# Patient Record
Sex: MALE | Race: BLACK OR AFRICAN AMERICAN | NOT HISPANIC OR LATINO | URBAN - METROPOLITAN AREA
[De-identification: names, ages, dates, MRNs, and addresses within clinical notes are randomized per-mention and may not be internally consistent; named-entity substitution may affect disease eponyms.]

---

## 2021-03-13 ENCOUNTER — INPATIENT (INPATIENT)
Facility: HOSPITAL | Age: 61
LOS: 2 days | Discharge: HOME | End: 2021-03-16
Attending: INTERNAL MEDICINE | Admitting: INTERNAL MEDICINE
Payer: COMMERCIAL

## 2021-03-13 VITALS
TEMPERATURE: 98 F | WEIGHT: 268.08 LBS | HEIGHT: 72 IN | SYSTOLIC BLOOD PRESSURE: 128 MMHG | OXYGEN SATURATION: 88 % | DIASTOLIC BLOOD PRESSURE: 80 MMHG | HEART RATE: 97 BPM | RESPIRATION RATE: 16 BRPM

## 2021-03-13 DIAGNOSIS — J96.01 ACUTE RESPIRATORY FAILURE WITH HYPOXIA: ICD-10-CM

## 2021-03-13 DIAGNOSIS — U07.1 COVID-19: ICD-10-CM

## 2021-03-13 DIAGNOSIS — E11.65 TYPE 2 DIABETES MELLITUS WITH HYPERGLYCEMIA: ICD-10-CM

## 2021-03-13 LAB
ALBUMIN SERPL ELPH-MCNC: 3.3 G/DL — SIGNIFICANT CHANGE UP (ref 3.3–5)
ALBUMIN SERPL ELPH-MCNC: 3.5 G/DL — SIGNIFICANT CHANGE UP (ref 3.3–5)
ALP SERPL-CCNC: 51 U/L — SIGNIFICANT CHANGE UP (ref 40–120)
ALP SERPL-CCNC: 53 U/L — SIGNIFICANT CHANGE UP (ref 40–120)
ALT FLD-CCNC: 42 U/L — SIGNIFICANT CHANGE UP (ref 12–78)
ALT FLD-CCNC: 43 U/L — SIGNIFICANT CHANGE UP (ref 12–78)
ANION GAP SERPL CALC-SCNC: 7 MMOL/L — SIGNIFICANT CHANGE UP (ref 5–17)
AST SERPL-CCNC: 77 U/L — HIGH (ref 15–37)
AST SERPL-CCNC: 82 U/L — HIGH (ref 15–37)
BASOPHILS # BLD AUTO: 0.01 K/UL — SIGNIFICANT CHANGE UP (ref 0–0.2)
BASOPHILS NFR BLD AUTO: 0.2 % — SIGNIFICANT CHANGE UP (ref 0–2)
BILIRUB DIRECT SERPL-MCNC: 0.15 MG/DL — SIGNIFICANT CHANGE UP (ref 0.05–0.2)
BILIRUB INDIRECT FLD-MCNC: 0.4 MG/DL — SIGNIFICANT CHANGE UP (ref 0.2–1)
BILIRUB SERPL-MCNC: 0.5 MG/DL — SIGNIFICANT CHANGE UP (ref 0.2–1.2)
BILIRUB SERPL-MCNC: 0.6 MG/DL — SIGNIFICANT CHANGE UP (ref 0.2–1.2)
BUN SERPL-MCNC: 11 MG/DL — SIGNIFICANT CHANGE UP (ref 7–23)
CALCIUM SERPL-MCNC: 8.7 MG/DL — SIGNIFICANT CHANGE UP (ref 8.5–10.1)
CHLORIDE SERPL-SCNC: 100 MMOL/L — SIGNIFICANT CHANGE UP (ref 96–108)
CO2 SERPL-SCNC: 29 MMOL/L — SIGNIFICANT CHANGE UP (ref 22–31)
CREAT SERPL-MCNC: 1.24 MG/DL — SIGNIFICANT CHANGE UP (ref 0.5–1.3)
CREAT SERPL-MCNC: 1.32 MG/DL — HIGH (ref 0.5–1.3)
CRP SERPL-MCNC: 144 MG/L — HIGH
D DIMER BLD IA.RAPID-MCNC: 186 NG/ML DDU — SIGNIFICANT CHANGE UP
D DIMER BLD IA.RAPID-MCNC: 191 NG/ML DDU — SIGNIFICANT CHANGE UP
EOSINOPHIL # BLD AUTO: 0 K/UL — SIGNIFICANT CHANGE UP (ref 0–0.5)
EOSINOPHIL NFR BLD AUTO: 0 % — SIGNIFICANT CHANGE UP (ref 0–6)
FERRITIN SERPL-MCNC: 1021 NG/ML — HIGH (ref 30–400)
GLUCOSE BLDC GLUCOMTR-MCNC: 306 MG/DL — HIGH (ref 70–99)
GLUCOSE SERPL-MCNC: 178 MG/DL — HIGH (ref 70–99)
HCT VFR BLD CALC: 33.1 % — LOW (ref 39–50)
HGB BLD-MCNC: 11.7 G/DL — LOW (ref 13–17)
IMM GRANULOCYTES NFR BLD AUTO: 0.2 % — SIGNIFICANT CHANGE UP (ref 0–1.5)
INR BLD: 1.37 RATIO — HIGH (ref 0.88–1.16)
LYMPHOCYTES # BLD AUTO: 0.98 K/UL — LOW (ref 1–3.3)
LYMPHOCYTES # BLD AUTO: 22.9 % — SIGNIFICANT CHANGE UP (ref 13–44)
MCHC RBC-ENTMCNC: 29.5 PG — SIGNIFICANT CHANGE UP (ref 27–34)
MCHC RBC-ENTMCNC: 35.3 GM/DL — SIGNIFICANT CHANGE UP (ref 32–36)
MCV RBC AUTO: 83.4 FL — SIGNIFICANT CHANGE UP (ref 80–100)
MONOCYTES # BLD AUTO: 0.39 K/UL — SIGNIFICANT CHANGE UP (ref 0–0.9)
MONOCYTES NFR BLD AUTO: 9.1 % — SIGNIFICANT CHANGE UP (ref 2–14)
NEUTROPHILS # BLD AUTO: 2.89 K/UL — SIGNIFICANT CHANGE UP (ref 1.8–7.4)
NEUTROPHILS NFR BLD AUTO: 67.6 % — SIGNIFICANT CHANGE UP (ref 43–77)
NRBC # BLD: 0 /100 WBCS — SIGNIFICANT CHANGE UP (ref 0–0)
NT-PROBNP SERPL-SCNC: 11 PG/ML — SIGNIFICANT CHANGE UP (ref 0–125)
PLATELET # BLD AUTO: 235 K/UL — SIGNIFICANT CHANGE UP (ref 150–400)
POTASSIUM SERPL-MCNC: 3.6 MMOL/L — SIGNIFICANT CHANGE UP (ref 3.5–5.3)
POTASSIUM SERPL-SCNC: 3.6 MMOL/L — SIGNIFICANT CHANGE UP (ref 3.5–5.3)
PROCALCITONIN SERPL-MCNC: 0.14 NG/ML — HIGH (ref 0.02–0.1)
PROT SERPL-MCNC: 8.2 GM/DL — SIGNIFICANT CHANGE UP (ref 6–8.3)
PROT SERPL-MCNC: 8.3 GM/DL — SIGNIFICANT CHANGE UP (ref 6–8.3)
PROTHROM AB SERPL-ACNC: 15.7 SEC — HIGH (ref 10.6–13.6)
RAPID RVP RESULT: DETECTED
RBC # BLD: 3.97 M/UL — LOW (ref 4.2–5.8)
RBC # FLD: 13.3 % — SIGNIFICANT CHANGE UP (ref 10.3–14.5)
SARS-COV-2 RNA SPEC QL NAA+PROBE: DETECTED
SODIUM SERPL-SCNC: 136 MMOL/L — SIGNIFICANT CHANGE UP (ref 135–145)
WBC # BLD: 4.28 K/UL — SIGNIFICANT CHANGE UP (ref 3.8–10.5)
WBC # FLD AUTO: 4.28 K/UL — SIGNIFICANT CHANGE UP (ref 3.8–10.5)

## 2021-03-13 PROCEDURE — 99285 EMERGENCY DEPT VISIT HI MDM: CPT

## 2021-03-13 PROCEDURE — 71045 X-RAY EXAM CHEST 1 VIEW: CPT | Mod: 26

## 2021-03-13 RX ORDER — ACETAMINOPHEN 500 MG
650 TABLET ORAL EVERY 4 HOURS
Refills: 0 | Status: DISCONTINUED | OUTPATIENT
Start: 2021-03-13 | End: 2021-03-16

## 2021-03-13 RX ORDER — SODIUM CHLORIDE 9 MG/ML
1000 INJECTION, SOLUTION INTRAVENOUS
Refills: 0 | Status: DISCONTINUED | OUTPATIENT
Start: 2021-03-13 | End: 2021-03-16

## 2021-03-13 RX ORDER — ALBUTEROL 90 UG/1
2 AEROSOL, METERED ORAL EVERY 4 HOURS
Refills: 0 | Status: DISCONTINUED | OUTPATIENT
Start: 2021-03-13 | End: 2021-03-16

## 2021-03-13 RX ORDER — DEXAMETHASONE 0.5 MG/5ML
6 ELIXIR ORAL ONCE
Refills: 0 | Status: COMPLETED | OUTPATIENT
Start: 2021-03-13 | End: 2021-03-13

## 2021-03-13 RX ORDER — DEXTROSE 50 % IN WATER 50 %
25 SYRINGE (ML) INTRAVENOUS ONCE
Refills: 0 | Status: DISCONTINUED | OUTPATIENT
Start: 2021-03-13 | End: 2021-03-16

## 2021-03-13 RX ORDER — REMDESIVIR 5 MG/ML
200 INJECTION INTRAVENOUS EVERY 24 HOURS
Refills: 0 | Status: COMPLETED | OUTPATIENT
Start: 2021-03-13 | End: 2021-03-13

## 2021-03-13 RX ORDER — GUAIFENESIN/DEXTROMETHORPHAN 600MG-30MG
10 TABLET, EXTENDED RELEASE 12 HR ORAL EVERY 4 HOURS
Refills: 0 | Status: DISCONTINUED | OUTPATIENT
Start: 2021-03-13 | End: 2021-03-16

## 2021-03-13 RX ORDER — ENOXAPARIN SODIUM 100 MG/ML
40 INJECTION SUBCUTANEOUS EVERY 12 HOURS
Refills: 0 | Status: DISCONTINUED | OUTPATIENT
Start: 2021-03-13 | End: 2021-03-16

## 2021-03-13 RX ORDER — SODIUM CHLORIDE 9 MG/ML
1000 INJECTION INTRAMUSCULAR; INTRAVENOUS; SUBCUTANEOUS
Refills: 0 | Status: DISCONTINUED | OUTPATIENT
Start: 2021-03-13 | End: 2021-03-15

## 2021-03-13 RX ORDER — DEXAMETHASONE 0.5 MG/5ML
6 ELIXIR ORAL DAILY
Refills: 0 | Status: DISCONTINUED | OUTPATIENT
Start: 2021-03-13 | End: 2021-03-15

## 2021-03-13 RX ORDER — INSULIN LISPRO 100/ML
VIAL (ML) SUBCUTANEOUS
Refills: 0 | Status: DISCONTINUED | OUTPATIENT
Start: 2021-03-13 | End: 2021-03-16

## 2021-03-13 RX ORDER — REMDESIVIR 5 MG/ML
100 INJECTION INTRAVENOUS EVERY 24 HOURS
Refills: 0 | Status: DISCONTINUED | OUTPATIENT
Start: 2021-03-14 | End: 2021-03-16

## 2021-03-13 RX ORDER — DEXTROSE 50 % IN WATER 50 %
12.5 SYRINGE (ML) INTRAVENOUS ONCE
Refills: 0 | Status: DISCONTINUED | OUTPATIENT
Start: 2021-03-13 | End: 2021-03-16

## 2021-03-13 RX ORDER — REMDESIVIR 5 MG/ML
INJECTION INTRAVENOUS
Refills: 0 | Status: DISCONTINUED | OUTPATIENT
Start: 2021-03-13 | End: 2021-03-16

## 2021-03-13 RX ORDER — DEXTROSE 50 % IN WATER 50 %
15 SYRINGE (ML) INTRAVENOUS ONCE
Refills: 0 | Status: DISCONTINUED | OUTPATIENT
Start: 2021-03-13 | End: 2021-03-16

## 2021-03-13 RX ORDER — PANTOPRAZOLE SODIUM 20 MG/1
40 TABLET, DELAYED RELEASE ORAL
Refills: 0 | Status: DISCONTINUED | OUTPATIENT
Start: 2021-03-13 | End: 2021-03-16

## 2021-03-13 RX ORDER — GLUCAGON INJECTION, SOLUTION 0.5 MG/.1ML
1 INJECTION, SOLUTION SUBCUTANEOUS ONCE
Refills: 0 | Status: DISCONTINUED | OUTPATIENT
Start: 2021-03-13 | End: 2021-03-16

## 2021-03-13 RX ADMIN — ENOXAPARIN SODIUM 40 MILLIGRAM(S): 100 INJECTION SUBCUTANEOUS at 17:46

## 2021-03-13 RX ADMIN — Medication 6 MILLIGRAM(S): at 12:07

## 2021-03-13 RX ADMIN — REMDESIVIR 500 MILLIGRAM(S): 5 INJECTION INTRAVENOUS at 23:03

## 2021-03-13 RX ADMIN — Medication 8: at 16:43

## 2021-03-13 NOTE — ED PROVIDER NOTE - CARE PLAN
Principal Discharge DX:	COVID-19  Secondary Diagnosis:	Hypoxia   Principal Discharge DX:	COVID-19  Secondary Diagnosis:	Hypoxia  Secondary Diagnosis:	Pneumonia

## 2021-03-13 NOTE — H&P ADULT - NSHPPHYSICALEXAM_GEN_ALL_CORE
PHYSICAL EXAM:  GENERAL: NAD, well-groomed, well-developed  HEAD:  Atraumatic, Normocephalic  EYES: PERRLA, conjunctiva and sclera clear  ENMT: Intact   NECK: Supple,   NERVOUS SYSTEM:  Alert & Oriented X3; Motor Strength 5/5   CHEST/LUNG: Crackles base bilaterally; No rales, rhonchi, wheezing, or rubs  HEART: Regular rate and rhythm; No murmurs, rubs, or gallops  ABDOMEN: Soft, Nontender, Nondistended; Bowel sounds present  EXTREMITIES:   No clubbing, cyanosis, or edema  SKIN: No rashes or lesions

## 2021-03-13 NOTE — H&P ADULT - HISTORY OF PRESENT ILLNESS
59yo, BM with h/o DM2 presents today c/o one week h/o flu like symptoms, associated with  dry cough, severe bodyaches, fevers and poor appetite, today pt reports having chest tightness and persistent cough.  Also noted   increasing SOB.  Had + COVID test on Thursday.   In ER, O2 sat 88%, placed on NRB with improvement.

## 2021-03-13 NOTE — CONSULT NOTE ADULT - SUBJECTIVE AND OBJECTIVE BOX
Patient is a 60y old  Male who presents with a chief complaint of Shortness of breath (13 Mar 2021 16:03)    HPI:  59yo, BM with Obesity, DM2 presents  c/o one week h/o flu like symptoms, associated with  dry cough, severe body aches,  fevers and poor appetite. Today  pt reports having chest tightness and persistent cough.  Also noted increasing SOB.  Had + COVID test on Thursday.   In ER, O2 sat 88%, placed on NRB with improvement.  (13 Mar 2021 16:03)    PAST MEDICAL & SURGICAL HISTORY:  Diabetes  No significant past surgical history    SOCIAL HISTORY: BMI (kg/m2): 36.4 (03-13 @ 09:50). Non rebreather    Allergies  No Known Allergies    MEDICATIONS  (STANDING):  dexAMETHasone  Injectable 6 milliGRAM(s) IV Push daily  dextrose 40% Gel 15 Gram(s) Oral once  dextrose 5%. 1000 milliLiter(s) (50 mL/Hr) IV Continuous <Continuous>  dextrose 5%. 1000 milliLiter(s) (100 mL/Hr) IV Continuous <Continuous>  dextrose 50% Injectable 25 Gram(s) IV Push once  dextrose 50% Injectable 12.5 Gram(s) IV Push once  dextrose 50% Injectable 25 Gram(s) IV Push once  enoxaparin Injectable 40 milliGRAM(s) SubCutaneous every 12 hours  glucagon  Injectable 1 milliGRAM(s) IntraMuscular once  insulin lispro (ADMELOG) corrective regimen sliding scale   SubCutaneous three times a day before meals  pantoprazole    Tablet 40 milliGRAM(s) Oral before breakfast  remdesivir  IVPB   IV Intermittent   remdesivir  IVPB 200 milliGRAM(s) IV Intermittent every 24 hours    MEDICATIONS  (PRN):  acetaminophen   Tablet .. 650 milliGRAM(s) Oral every 4 hours PRN Temp greater or equal to 38.5C (101.3F)  ALBUTerol    90 MICROgram(s) HFA Inhaler 2 Puff(s) Inhalation every 4 hours PRN Shortness of Breath and/or Wheezing  guaifenesin/dextromethorphan  Syrup 10 milliLiter(s) Oral every 4 hours PRN Cough    Vital Signs Last 24 Hrs  T(C): 36.7 (13 Mar 2021 18:35), Max: 37.7 (13 Mar 2021 12:41)  T(F): 98.1 (13 Mar 2021 18:35), Max: 99.8 (13 Mar 2021 12:41)  HR: 98 (13 Mar 2021 18:35) (83 - 99)  BP: 109/74 (13 Mar 2021 18:35) (99/67 - 128/80)  BP(mean): --  RR: 18 (13 Mar 2021 18:35) (16 - 25)  SpO2: 100% (13 Mar 2021 18:35) (88% - 100%)    PHYSICAL EXAM:  GEN:         Awake, responsive.  HEENT:    Normal.    RESP:      mild Distress  CVS:        Regular rate and rhythm.   ABD:         Soft, non-tender, non-distended;   SKIN:           Warm and dry.  EXTR:            No clubbing, cyanosis or edema  CNS:              Intact sensory and motor function.  PSYCH:        cooperative, no anxiety or depression    LABS:                        11.7   4.28  )-----------( 235      ( 13 Mar 2021 10:27 )             33.1     03-13    x   |  x   |  x   ----------------------------<  x   x    |  x   |  1.32<H>    Ca    8.7      13 Mar 2021 10:27    TPro  8.2  /  Alb  3.3  /  TBili  0.5  /  DBili  .15  /  AST  82<H>  /  ALT  42  /  AlkPhos  53  03-13    PT/INR - ( 13 Mar 2021 16:13 )   PT: 15.7 sec;   INR: 1.37 ratio      EKG: sinus    RADIOLOGY & ADDITIONAL STUDIES:  < from: Xray Chest 1 View- PORTABLE-Urgent (Xray Chest 1 View- PORTABLE-Urgent .) (03.13.21 @ 11:35) >  EXAM:  XR CHEST PORTABLE URGENT 1V                          PROCEDURE DATE:  03/13/2021      INTERPRETATION:  Chest radiograph (one view)     CPT 37090    CLINICAL INFORMATION:  Patient is unable to communicate. Covid positive.  Short of breath.    TECHNIQUE:  Single frontal view of the chest was obtained.    FINDINGS:  No previous examinations are available for review.    The lungs demonstrate patchy airspace opacity in the right lower lobe suspicious for pneumonia. The right costophrenic angle is not imaged on the film. The left costophrenic angle appears sharp. The heart and mediastinum appear intact.      IMPRESSION: Patchy airspace opacity noted the right lower lobe suspicious for pneumonia.    NARCISA RICH MD; Attending Radiologist  This document has been electronically signed. Mar 13 2021 11:55AM    ASSESSMENT AND PLAN:  ·	Acute hypoxic Respiratory failure.  ·	COVID Pneumonia.  ·	DM.  ·	Obesity    SPO2 88% on room air in ED, now requiring NRB mask.  Continue Remdesivir and Dexamethasone.  ON BID Lovenox.  Gentle hydration.  Spoke with medicine PA, to get in touch with ID for Tocilizumab.

## 2021-03-13 NOTE — ED PROVIDER NOTE - OBJECTIVE STATEMENT
60 year old male with h/o diabetes presents today c/o one week h/o flu like symptoms, pt reports having a dry cough, severe bodyaches, fevers and poor appetite, today pt reports having chest tightness and persistent cough (-) nausea or vomiting (-) diarrhea (-)

## 2021-03-13 NOTE — ED ADULT NURSE NOTE - OBJECTIVE STATEMENT
Received patient in bed 13. AOX4. BIBA c/o weakness for three days and patient tested positive for COVID yesterday. POX low 88%RA. witnessed ambulating with steady gait. Monitor/EKG completed. Patient speaking clearly but needing to catch breath between words. Awaiting to be seen

## 2021-03-13 NOTE — H&P ADULT - NSHPLABSRESULTS_GEN_ALL_CORE
LABS:                        11.7   4.28  )-----------( 235      ( 13 Mar 2021 10:27 )             33.1     03-13    136  |  100  |  11  ----------------------------<  178<H>  3.6   |  29  |  1.24    Ca    8.7      13 Mar 2021 10:27    TPro  8.3  /  Alb  3.5  /  TBili  0.6  /  DBili  x   /  AST  77<H>  /  ALT  43  /  AlkPhos  51  03-13    Ferritin, Serum: 1021 ng/mL (03.13.21 @ 14:08)   Procalcitonin, Serum: 0.14: Procalcitonin (PCT) Interpretation (ng/mL) - Diagnosis of systemic   C-Reactive Protein, Serum: 144: Please note: Reference range has changed due to units of measure change.   D-Dimer Assay, Quantitative: 191 ng/mL DDU (03.13.21 @ 10:27)       SARS-CoV-2: Detected: This Respiratory Panel uses polymerase chain reaction (PCR) to detect for         < from: Xray Chest 1 View- PORTABLE-Urgent (Xray Chest 1 View- PORTABLE-Urgent .) (03.13.21 @ 11:35) >      CLINICAL INFORMATION:  Patient is unable to communicate. Covid positive.  Short of breath.    TECHNIQUE:  Single frontal view of the chest was obtained.    FINDINGS:  No previous examinations are available for review.    The lungs demonstrate patchy airspace opacity in the right lower lobe suspicious for pneumonia. The right costophrenic angle is not imaged on the film. The left costophrenic angle appears sharp. The heart and mediastinum appear intact.      IMPRESSION: Patchy airspace opacity noted the right lower lobe suspicious for pneumonia.        < end of copied text >

## 2021-03-14 LAB
A1C WITH ESTIMATED AVERAGE GLUCOSE RESULT: 8.3 % — HIGH (ref 4–5.6)
ALBUMIN SERPL ELPH-MCNC: 3 G/DL — LOW (ref 3.3–5)
ALBUMIN SERPL ELPH-MCNC: 3.1 G/DL — LOW (ref 3.3–5)
ALP SERPL-CCNC: 47 U/L — SIGNIFICANT CHANGE UP (ref 40–120)
ALP SERPL-CCNC: 52 U/L — SIGNIFICANT CHANGE UP (ref 40–120)
ALT FLD-CCNC: 42 U/L — SIGNIFICANT CHANGE UP (ref 12–78)
ALT FLD-CCNC: 45 U/L — SIGNIFICANT CHANGE UP (ref 12–78)
ANION GAP SERPL CALC-SCNC: 7 MMOL/L — SIGNIFICANT CHANGE UP (ref 5–17)
AST SERPL-CCNC: 67 U/L — HIGH (ref 15–37)
AST SERPL-CCNC: 68 U/L — HIGH (ref 15–37)
BILIRUB DIRECT SERPL-MCNC: 0.11 MG/DL — SIGNIFICANT CHANGE UP (ref 0.05–0.2)
BILIRUB INDIRECT FLD-MCNC: 0.2 MG/DL — SIGNIFICANT CHANGE UP (ref 0.2–1)
BILIRUB SERPL-MCNC: 0.3 MG/DL — SIGNIFICANT CHANGE UP (ref 0.2–1.2)
BILIRUB SERPL-MCNC: 0.3 MG/DL — SIGNIFICANT CHANGE UP (ref 0.2–1.2)
BUN SERPL-MCNC: 15 MG/DL — SIGNIFICANT CHANGE UP (ref 7–23)
CALCIUM SERPL-MCNC: 8.6 MG/DL — SIGNIFICANT CHANGE UP (ref 8.5–10.1)
CHLORIDE SERPL-SCNC: 103 MMOL/L — SIGNIFICANT CHANGE UP (ref 96–108)
CO2 SERPL-SCNC: 28 MMOL/L — SIGNIFICANT CHANGE UP (ref 22–31)
CREAT SERPL-MCNC: 1.12 MG/DL — SIGNIFICANT CHANGE UP (ref 0.5–1.3)
CREAT SERPL-MCNC: 1.15 MG/DL — SIGNIFICANT CHANGE UP (ref 0.5–1.3)
CRP SERPL-MCNC: 138 MG/L — HIGH
CRP SERPL-MCNC: 150 MG/L — HIGH
D DIMER BLD IA.RAPID-MCNC: 257 NG/ML DDU — HIGH
ESTIMATED AVERAGE GLUCOSE: 192 MG/DL — HIGH (ref 68–114)
FERRITIN SERPL-MCNC: 1047 NG/ML — HIGH (ref 30–400)
FERRITIN SERPL-MCNC: 1112 NG/ML — HIGH (ref 30–400)
GLUCOSE BLDC GLUCOMTR-MCNC: 271 MG/DL — HIGH (ref 70–99)
GLUCOSE BLDC GLUCOMTR-MCNC: 275 MG/DL — HIGH (ref 70–99)
GLUCOSE BLDC GLUCOMTR-MCNC: 277 MG/DL — HIGH (ref 70–99)
GLUCOSE BLDC GLUCOMTR-MCNC: 330 MG/DL — HIGH (ref 70–99)
GLUCOSE SERPL-MCNC: 249 MG/DL — HIGH (ref 70–99)
HCT VFR BLD CALC: 32 % — LOW (ref 39–50)
HCV AB S/CO SERPL IA: 0.11 S/CO — SIGNIFICANT CHANGE UP (ref 0–0.99)
HCV AB SERPL-IMP: SIGNIFICANT CHANGE UP
HGB BLD-MCNC: 11 G/DL — LOW (ref 13–17)
INR BLD: 1.32 RATIO — HIGH (ref 0.88–1.16)
LDH SERPL L TO P-CCNC: 411 U/L — HIGH (ref 50–242)
LDH SERPL L TO P-CCNC: 439 U/L — HIGH (ref 50–242)
MCHC RBC-ENTMCNC: 28.8 PG — SIGNIFICANT CHANGE UP (ref 27–34)
MCHC RBC-ENTMCNC: 34.4 GM/DL — SIGNIFICANT CHANGE UP (ref 32–36)
MCV RBC AUTO: 83.8 FL — SIGNIFICANT CHANGE UP (ref 80–100)
NRBC # BLD: 0 /100 WBCS — SIGNIFICANT CHANGE UP (ref 0–0)
PLATELET # BLD AUTO: 264 K/UL — SIGNIFICANT CHANGE UP (ref 150–400)
POTASSIUM SERPL-MCNC: 4.1 MMOL/L — SIGNIFICANT CHANGE UP (ref 3.5–5.3)
POTASSIUM SERPL-SCNC: 4.1 MMOL/L — SIGNIFICANT CHANGE UP (ref 3.5–5.3)
PROT SERPL-MCNC: 7.6 GM/DL — SIGNIFICANT CHANGE UP (ref 6–8.3)
PROT SERPL-MCNC: 7.8 GM/DL — SIGNIFICANT CHANGE UP (ref 6–8.3)
PROTHROM AB SERPL-ACNC: 15.1 SEC — HIGH (ref 10.6–13.6)
RBC # BLD: 3.82 M/UL — LOW (ref 4.2–5.8)
RBC # FLD: 13.4 % — SIGNIFICANT CHANGE UP (ref 10.3–14.5)
SODIUM SERPL-SCNC: 138 MMOL/L — SIGNIFICANT CHANGE UP (ref 135–145)
WBC # BLD: 4.73 K/UL — SIGNIFICANT CHANGE UP (ref 3.8–10.5)
WBC # FLD AUTO: 4.73 K/UL — SIGNIFICANT CHANGE UP (ref 3.8–10.5)

## 2021-03-14 RX ORDER — LEVOTHYROXINE SODIUM 125 MCG
137 TABLET ORAL DAILY
Refills: 0 | Status: DISCONTINUED | OUTPATIENT
Start: 2021-03-14 | End: 2021-03-16

## 2021-03-14 RX ORDER — LEVOTHYROXINE SODIUM 125 MCG
1 TABLET ORAL
Qty: 0 | Refills: 0 | DISCHARGE

## 2021-03-14 RX ADMIN — REMDESIVIR 500 MILLIGRAM(S): 5 INJECTION INTRAVENOUS at 22:10

## 2021-03-14 RX ADMIN — Medication 6: at 08:11

## 2021-03-14 RX ADMIN — ALBUTEROL 2 PUFF(S): 90 AEROSOL, METERED ORAL at 11:33

## 2021-03-14 RX ADMIN — Medication 10 MILLILITER(S): at 11:33

## 2021-03-14 RX ADMIN — Medication 6 MILLIGRAM(S): at 06:16

## 2021-03-14 RX ADMIN — SODIUM CHLORIDE 75 MILLILITER(S): 9 INJECTION INTRAMUSCULAR; INTRAVENOUS; SUBCUTANEOUS at 13:24

## 2021-03-14 RX ADMIN — SODIUM CHLORIDE 75 MILLILITER(S): 9 INJECTION INTRAMUSCULAR; INTRAVENOUS; SUBCUTANEOUS at 00:17

## 2021-03-14 RX ADMIN — SODIUM CHLORIDE 75 MILLILITER(S): 9 INJECTION INTRAMUSCULAR; INTRAVENOUS; SUBCUTANEOUS at 22:19

## 2021-03-14 RX ADMIN — Medication 6: at 16:32

## 2021-03-14 RX ADMIN — Medication 8: at 11:32

## 2021-03-14 RX ADMIN — ENOXAPARIN SODIUM 40 MILLIGRAM(S): 100 INJECTION SUBCUTANEOUS at 17:42

## 2021-03-14 RX ADMIN — PANTOPRAZOLE SODIUM 40 MILLIGRAM(S): 20 TABLET, DELAYED RELEASE ORAL at 06:17

## 2021-03-14 RX ADMIN — ENOXAPARIN SODIUM 40 MILLIGRAM(S): 100 INJECTION SUBCUTANEOUS at 06:16

## 2021-03-14 NOTE — CHART NOTE - NSCHARTNOTEFT_GEN_A_CORE
House Medicine PA    Called by Dr. Mcfarlane, pulmonology to discuss with infectious disease specialist if patient is a candidate for Tocilizumab. Per Dr. Persaud, patient is not a candidate, based on specific criteria. Will continue to monitor patient, and continue dexamethasone, and non-rebreather oxygen.

## 2021-03-15 LAB
ALBUMIN SERPL ELPH-MCNC: 2.9 G/DL — LOW (ref 3.3–5)
ALP SERPL-CCNC: 47 U/L — SIGNIFICANT CHANGE UP (ref 40–120)
ALT FLD-CCNC: 40 U/L — SIGNIFICANT CHANGE UP (ref 12–78)
ANION GAP SERPL CALC-SCNC: 8 MMOL/L — SIGNIFICANT CHANGE UP (ref 5–17)
AST SERPL-CCNC: 51 U/L — HIGH (ref 15–37)
BILIRUB DIRECT SERPL-MCNC: 0.14 MG/DL — SIGNIFICANT CHANGE UP (ref 0.05–0.2)
BILIRUB INDIRECT FLD-MCNC: 0.3 MG/DL — SIGNIFICANT CHANGE UP (ref 0.2–1)
BILIRUB SERPL-MCNC: 0.4 MG/DL — SIGNIFICANT CHANGE UP (ref 0.2–1.2)
BUN SERPL-MCNC: 17 MG/DL — SIGNIFICANT CHANGE UP (ref 7–23)
CALCIUM SERPL-MCNC: 8.7 MG/DL — SIGNIFICANT CHANGE UP (ref 8.5–10.1)
CHLORIDE SERPL-SCNC: 102 MMOL/L — SIGNIFICANT CHANGE UP (ref 96–108)
CO2 SERPL-SCNC: 26 MMOL/L — SIGNIFICANT CHANGE UP (ref 22–31)
CREAT SERPL-MCNC: 1.24 MG/DL — SIGNIFICANT CHANGE UP (ref 0.5–1.3)
CRP SERPL-MCNC: 73 MG/L — HIGH
FERRITIN SERPL-MCNC: 1202 NG/ML — HIGH (ref 30–400)
GLUCOSE BLDC GLUCOMTR-MCNC: 267 MG/DL — HIGH (ref 70–99)
GLUCOSE BLDC GLUCOMTR-MCNC: 277 MG/DL — HIGH (ref 70–99)
GLUCOSE BLDC GLUCOMTR-MCNC: 280 MG/DL — HIGH (ref 70–99)
GLUCOSE BLDC GLUCOMTR-MCNC: 307 MG/DL — HIGH (ref 70–99)
GLUCOSE BLDC GLUCOMTR-MCNC: 347 MG/DL — HIGH (ref 70–99)
GLUCOSE SERPL-MCNC: 265 MG/DL — HIGH (ref 70–99)
HCT VFR BLD CALC: 31.5 % — LOW (ref 39–50)
HGB BLD-MCNC: 10.9 G/DL — LOW (ref 13–17)
INR BLD: 1.4 RATIO — HIGH (ref 0.88–1.16)
LDH SERPL L TO P-CCNC: 457 U/L — HIGH (ref 50–242)
MCHC RBC-ENTMCNC: 29.1 PG — SIGNIFICANT CHANGE UP (ref 27–34)
MCHC RBC-ENTMCNC: 34.6 GM/DL — SIGNIFICANT CHANGE UP (ref 32–36)
MCV RBC AUTO: 84 FL — SIGNIFICANT CHANGE UP (ref 80–100)
NRBC # BLD: 0 /100 WBCS — SIGNIFICANT CHANGE UP (ref 0–0)
PLATELET # BLD AUTO: 334 K/UL — SIGNIFICANT CHANGE UP (ref 150–400)
POTASSIUM SERPL-MCNC: 3.7 MMOL/L — SIGNIFICANT CHANGE UP (ref 3.5–5.3)
POTASSIUM SERPL-SCNC: 3.7 MMOL/L — SIGNIFICANT CHANGE UP (ref 3.5–5.3)
PROT SERPL-MCNC: 7.2 GM/DL — SIGNIFICANT CHANGE UP (ref 6–8.3)
PROTHROM AB SERPL-ACNC: 16 SEC — HIGH (ref 10.6–13.6)
RBC # BLD: 3.75 M/UL — LOW (ref 4.2–5.8)
RBC # FLD: 13.4 % — SIGNIFICANT CHANGE UP (ref 10.3–14.5)
SODIUM SERPL-SCNC: 136 MMOL/L — SIGNIFICANT CHANGE UP (ref 135–145)
WBC # BLD: 9.81 K/UL — SIGNIFICANT CHANGE UP (ref 3.8–10.5)
WBC # FLD AUTO: 9.81 K/UL — SIGNIFICANT CHANGE UP (ref 3.8–10.5)

## 2021-03-15 PROCEDURE — 99254 IP/OBS CNSLTJ NEW/EST MOD 60: CPT

## 2021-03-15 RX ORDER — DEXAMETHASONE 0.5 MG/5ML
6 ELIXIR ORAL DAILY
Refills: 0 | Status: DISCONTINUED | OUTPATIENT
Start: 2021-03-16 | End: 2021-03-16

## 2021-03-15 RX ORDER — INSULIN GLARGINE 100 [IU]/ML
10 INJECTION, SOLUTION SUBCUTANEOUS AT BEDTIME
Refills: 0 | Status: DISCONTINUED | OUTPATIENT
Start: 2021-03-15 | End: 2021-03-16

## 2021-03-15 RX ADMIN — Medication 6: at 08:20

## 2021-03-15 RX ADMIN — ENOXAPARIN SODIUM 40 MILLIGRAM(S): 100 INJECTION SUBCUTANEOUS at 17:35

## 2021-03-15 RX ADMIN — Medication 8: at 11:33

## 2021-03-15 RX ADMIN — PANTOPRAZOLE SODIUM 40 MILLIGRAM(S): 20 TABLET, DELAYED RELEASE ORAL at 05:58

## 2021-03-15 RX ADMIN — REMDESIVIR 500 MILLIGRAM(S): 5 INJECTION INTRAVENOUS at 22:27

## 2021-03-15 RX ADMIN — Medication 6 MILLIGRAM(S): at 05:57

## 2021-03-15 RX ADMIN — Medication 10 MILLILITER(S): at 05:58

## 2021-03-15 RX ADMIN — ENOXAPARIN SODIUM 40 MILLIGRAM(S): 100 INJECTION SUBCUTANEOUS at 05:57

## 2021-03-15 RX ADMIN — Medication 6: at 16:34

## 2021-03-15 RX ADMIN — INSULIN GLARGINE 10 UNIT(S): 100 INJECTION, SOLUTION SUBCUTANEOUS at 21:39

## 2021-03-15 RX ADMIN — Medication 137 MICROGRAM(S): at 05:58

## 2021-03-15 NOTE — CONSULT NOTE ADULT - SUBJECTIVE AND OBJECTIVE BOX
Full note to follow.  COVID-19  Rapidly improved down to NC  Not a candidate for Tocilizumab at this time.  Contine RDV/Dex  Precautions per protocol, ideally airborne and contact in negative pressure room  Supplemental oxygen as required to maintain adequate saturation.  Avoid NIPPV, high flow O2, nebulilzers, or other interventions which may increase the likelihood of aerosolization as much as feasible  High threshold for antibiotic use  Vigilance for secondary infection and other superimposed events  Monitor inflammatory markers and lab data  DVT prophylaxis per protocol.  RDV x5 days  Trend creatinine and ALT on RDV  Dexamethasone [id number]  Good but not overly tight diabetic control to avoid iatrogenic hypoglycemia.   Thank you for the courtesy of this referral.   Papa Frye MD  Attending Physician  [facility]  Division of Infectious Diseases  [phone]  -------------------  [facility]  [phone]    KERVIN MARIE  60y, Male  [id number]    HPI--      PMH/PSH--  Diabetes    No significant past surgical history        Allergies--  No Known Allergies      Medications--  Antibiotics: remdesivir  IVPB   IV Intermittent   remdesivir  IVPB 100 milliGRAM(s) IV Intermittent every 24 hours    Immunologic:   Other: acetaminophen   Tablet .. PRN  ALBUTerol    90 MICROgram(s) HFA Inhaler PRN  dexAMETHasone     Tablet  dextrose 40% Gel  dextrose 5%.  dextrose 5%.  dextrose 50% Injectable  dextrose 50% Injectable  dextrose 50% Injectable  enoxaparin Injectable  glucagon  Injectable  guaifenesin/dextromethorphan  Syrup PRN  insulin lispro (ADMELOG) corrective regimen sliding scale  levothyroxine  pantoprazole    Tablet  sodium chloride 0.9%.    Antimicrobials last 90 days per EMR: MEDICATIONS  (STANDING):    remdesivir  IVPB   500 mL/Hr IV Intermittent (03-13-21 @ 23:03)    remdesivir  IVPB   500 mL/Hr IV Intermittent (03-14-21 @ 22:10)        Social History--  EtOH: denies   Tobacco: denies   Drug Use: denies     Family/Marital History--        Travel/Environmental/Occupational History:      Review of Systems:  A >=10-point review of systems was obtained.     Pertinent positives and negatives--  Constitutional: No fevers. No Chills. No Rigors.   Eyes:  ENMT:  Cardiovascular: No chest pain. No palpitations.  Respiratory: No shortness of breath. No cough.  Gastrointestinal: No nausea or vomiting. No diarrhea or constipation.   Genitourinary:  Musculoskeletal:  Skin:  Neurologic:  Psychiatric: Pleasant. Appropriate affect.  Endocrine:  Heme/Lymphatic:  Allergy/Immunologic:    Review of systems otherwise negative except as previously noted.    Physical Exam--  Vital Signs: T(F): 97.7 (03-15-21 @ 11:12), Max: 98.7 (03-15-21 @ 05:03)  HR: 85 (03-15-21 @ 11:12)  BP: 127/81 (03-15-21 @ 11:12)  RR: 18 (03-15-21 @ 11:12)  SpO2: 96% (03-15-21 @ 11:12)  Wt(kg): --  General: Nontoxic-appearing Male in no acute distress.  HEENT: AT/NC. PERRL. EOMI. Anicteric. Conjunctiva pink and moist. Oropharynx clear. Dentition fair.  Neck: Not rigid. No sense of mass.  Nodes: None palpable.  Lungs: Clear bilaterally without rales, wheezing or rhonchi  Heart: Regular rate and rhythm. No Murmur. No rub. No gallop. No palpable thrill.  Abdomen: Bowel sounds present and normoactive. Soft. Nondistended. Nontender. No sense of mass. No organomegaly.  Back: No spinal tenderness. No costovertebral angle tenderness.   Extremities: No cyanosis or clubbing. No edema.   Skin: Warm. Dry. Good turgor. No rash. No vasculitic stigmata.  Psychiatric: Appropriate affect and mood for situation.         Laboratory & Imaging Data--  CBC                        10.9   9.81  )-----------( 334      ( 15 Mar 2021 08:09 )             31.5       Chemistries  03-15    136  |  102  |  17  ----------------------------<  265<H>  3.7   |  26  |  1.24    Ca    8.7      15 Mar 2021 08:09    TPro  7.2  /  Alb  2.9<L>  /  TBili  0.4  /  DBili  .14  /  AST  51<H>  /  ALT  40  /  AlkPhos  47  03-15      Culture Data       Full note to follow.  COVID-19  Rapidly improved down to NC  Not a candidate for Tocilizumab at this time.  Contine RDV/Dex  Precautions per protocol, ideally airborne and contact in negative pressure room  Supplemental oxygen as required to maintain adequate saturation.  Avoid NIPPV, high flow O2, nebulilzers, or other interventions which may increase the likelihood of aerosolization as much as feasible  High threshold for antibiotic use  Vigilance for secondary infection and other superimposed events  Monitor inflammatory markers and lab data  DVT prophylaxis per protocol.  RDV x5 days  Trend creatinine and ALT on RDV  Dexamethasone [id number]  Good but not overly tight diabetic control to avoid iatrogenic hypoglycemia.   Thank you for the courtesy of this referral.   Papa Frye MD  Attending Physician  [facility]  Division of Infectious Diseases  [phone]  -------------------  [facility]  Division of Infectious Diseases  [phone]    MARIE GALEANA  60y, Male  [id number]    HPI--  60M with DM,2 on metformin, hypothyroid on synthroid, denies other significant PMH presented with frequent cough and shortness of breath accompanied by fevers, chills, weakness, myalgias and anorexia for 1 week prior to admission. Patient tested for COVID 3/11, felt very depressed about it afterward. thinks he was likely exposed at work. Patient did not improve and presented to ED. Patient hypoxic on RA an immediately placed on NRB. Dr. Javier called by PA (though was not on call):  House Medicine PA    Called by Dr. Mcfarlane, pulmonology to discuss with infectious disease specialist if patient is a candidate for Tocilizumab. Per Dr. Persaud, patient is not a candidate, based on specific criteria. Will continue to monitor patient, and continue dexamethasone, and non-rebreather oxygen.House Medicine PA    He was given steroids and RDV. Patient today on NC and feeling "much better."    PMH/PSH--  Diabetes    No significant past surgical history      Allergies--  No Known Allergies      Medications--  Antibiotics: remdesivir  IVPB   IV Intermittent   remdesivir  IVPB 100 milliGRAM(s) IV Intermittent every 24 hours    Immunologic:   Other: acetaminophen   Tablet .. PRN  ALBUTerol    90 MICROgram(s) HFA Inhaler PRN  dexAMETHasone     Tablet  dextrose 40% Gel  dextrose 5%.  dextrose 5%.  dextrose 50% Injectable  dextrose 50% Injectable  dextrose 50% Injectable  enoxaparin Injectable  glucagon  Injectable  guaifenesin/dextromethorphan  Syrup PRN  insulin lispro (ADMELOG) corrective regimen sliding scale  levothyroxine  pantoprazole    Tablet  sodium chloride 0.9%.    Antimicrobials last 90 days per EMR: MEDICATIONS  (STANDING):    remdesivir  IVPB   500 mL/Hr IV Intermittent (03-13-21 @ 23:03)    remdesivir  IVPB   500 mL/Hr IV Intermittent (03-14-21 @ 22:10)        Social History--  EtOH: denies   Tobacco: denies   Drug Use: denies     Family/Marital History--        Travel/Environmental/Occupational History:  Works in construction/management    Review of Systems:  A >=10-point review of systems was obtained.     Pertinent positives and negatives--  Constitutional: +fevers. + Chills. No Rigors.   Eyes: denies.   ENMT: no loss os smell or taste  Cardiovascular: No chest pain. No palpitations.  Respiratory: + shortness of breath. + cough.  Gastrointestinal: No nausea or vomiting. No diarrhea or constipation.   Genitourinary: denies.   Musculoskeletal: as above  Skin: denies.   Neurologic: denies.   Psychiatric: Pleasant. Appropriate affect.  Endocrine: as above  Heme/Lymphatic: denies.   Allergy/Immunologic: denies.     Review of systems otherwise negative except as previously noted.    Physical Exam--  Vital Signs: T(F): 97.7 (03-15-21 @ 11:12), Max: 98.7 (03-15-21 @ 05:03)  HR: 85 (03-15-21 @ 11:12)  BP: 127/81 (03-15-21 @ 11:12)  RR: 18 (03-15-21 @ 11:12)  SpO2: 96% (03-15-21 @ 11:12)  Wt(kg): --  General: Nontoxic-appearing Male in no acute distress.  HEENT: AT/NC. PERRL. EOMI. Anicteric. Conjunctiva pink and moist. Oropharynx clear.  Neck: Not rigid. No sense of mass.  Nodes: None palpable.  Lungs: Diminsihed BS bilaterally without rales, wheezing or rhonchi  Heart: Regular rate and rhythm. No Murmur. No rub. No gallop. No palpable thrill.  Abdomen: Bowel sounds present and normoactive. Soft. Nondistended. Nontender. No sense of mass. No organomegaly.  Back: No spinal tenderness. No costovertebral angle tenderness.   Extremities: No cyanosis or clubbing. No edema.   Skin: Warm. Dry. Good turgor. No rash. No vasculitic stigmata.  Psychiatric: Appropriate affect and mood for situation.         Laboratory & Imaging Data--  CBC                        10.9   9.81  )-----------( 334      ( 15 Mar 2021 08:09 )             31.5       Chemistries  03-15    136  |  102  |  17  ----------------------------<  265<H>  3.7   |  26  |  1.24    Ca    8.7      15 Mar 2021 08:09    TPro  7.2  /  Alb  2.9<L>  /  TBili  0.4  /  DBili  .14  /  AST  51<H>  /  ALT  40  /  AlkPhos  47  03-15    COVID-related labs  Neutrophil and Lymphocyte count (NLR <3 vs. >5, better vs. worse prognosis)  Auto Neutrophil #: 2.89 K/uL (03-13-21 @ 10:27)  Auto Lymphocyte #: 0.98 K/uL (03-13-21 @ 10:27)      Procalcitonin (<0.2, worse prognosis)  0.14 ng/mL (03-13-21 @ 14:06)      Ferritin (<450 vs. >850, better vs. worse prognosis)  1202 ng/mL (03-15-21 @ 11:49)  1112 ng/mL (03-14-21 @ 11:22)  1047 ng/mL (03-14-21 @ 00:28)  1021 ng/mL (03-13-21 @ 14:08)      CRP (<20 mg/dL vs. >60 mg/dL , better vs. worse prognosis)  73 mg/L (03-15-21 @ 11:50)  138 mg/L (03-14-21 @ 11:17)  150 mg/L (03-14-21 @ 00:20)  144 mg/L (03-13-21 @ 14:06)      D-dimer (<1000 vs. > 1000, better vs. worse prognosis)  257 ng/mL DDU (03-14-21 @ 07:15)  186 ng/mL DDU (03-13-21 @ 16:13)  191 ng/mL DDU (03-13-21 @ 10:27)      Creatinine/estGFR  Creatinine, Serum: 1.24 mg/dL (03-15-21 @ 08:09)  eGFR if Non African American: 63 mL/min/1.73M2 (03-15-21 @ 08:09)  eGFR if : 73 mL/min/1.73M2 (03-15-21 @ 08:09)  Creatinine, Serum: 1.12 mg/dL (03-14-21 @ 07:15)  eGFR if Non African American: 72 mL/min/1.73M2 (03-14-21 @ 07:15)  eGFR if African American: 83 mL/min/1.73M2 (03-14-21 @ 07:15)  Creatinine, Serum: 1.15 mg/dL (03-14-21 @ 07:15)  eGFR if Non : 69 mL/min/1.73M2 (03-14-21 @ 07:15)  eGFR if African American: 80 mL/min/1.73M2 (03-14-21 @ 07:15)  Creatinine, Serum: 1.32 mg/dL (03-13-21 @ 16:12)  eGFR if Non African American: 59 mL/min/1.73M2 (03-13-21 @ 16:12)  eGFR if : 68 mL/min/1.73M2 (03-13-21 @ 16:12)  Creatinine, Serum: 1.24 mg/dL (03-13-21 @ 10:27)  eGFR if Non African American: 63 mL/min/1.73M2 (03-13-21 @ 10:27)  eGFR if : 73 mL/min/1.73M2 (03-13-21 @ 10:27)      ALT  40 U/L (03-15-21 @ 08:09)  45 U/L (03-14-21 @ 07:15)  42 U/L (03-14-21 @ 07:15)  42 U/L (03-13-21 @ 16:12)  43 U/L (03-13-21 @ 10:27)      Culture Data  None    < from: Xray Chest 1 View- PORTABLE-Urgent (Xray Chest 1 View- PORTABLE-Urgent .) (03.13.21 @ 11:35) >    EXAM:  XR CHEST PORTABLE URGENT 1V                            PROCEDURE DATE:  03/13/2021          INTERPRETATION:  Chest radiograph (one view)     CPT 59259    CLINICAL INFORMATION:  Patient is unable to communicate. Covid positive.  Short of breath.    TECHNIQUE:  Single frontal view of the chest was obtained.    FINDINGS:  No previous examinations are available for review.    The lungs demonstrate patchy airspace opacity in the right lower lobe suspicious for pneumonia. The right costophrenic angle is not imaged on the film. The left costophrenic angle appears sharp. The heart and mediastinum appear intact.    IMPRESSION: Patchy airspace opacity noted the right lower lobe suspicious for pneumonia.    NARCISA RICH MD; Attending Radiologist  This document has been electronically signed. Mar 13 2021 11:55AM    < end of copied text >

## 2021-03-15 NOTE — CONSULT NOTE ADULT - ASSESSMENT
COVID-19 with pneumonia, hypoxemia, Rapidly improved down to NC. He is not a candidate for Tocilizumab at this time.  The clinical and experimental literature involving medications in SARS-CoV-2/COVID-19 evolves rapidly as we learn more about the virus.     A general COVID-19 severity of illness categorization (NIH.gov):  •Asymptomatic or Presymptomatic Infection: Individuals who test positive for SARS-CoV-2 by virologic testing using a molecular diagnostic (e.g., polymerase chain reaction) or antigen test, but have no symptoms.  •Mild Illness: Individuals who have any of the various signs and symptoms of COVID 19 (e.g., fever, cough, sore throat, malaise, headache, muscle pain) without shortness of breath, dyspnea, or abnormal chest imaging.  •Moderate Illness: Individuals who have evidence of lower respiratory disease by clinical assessment or imaging and a saturationof oxygen (SpO2) greater than or equal to 94% on room air.  •Severe Illness: Individuals who have respiratory frequency more than 30 breaths per minute, SpO2 less than 94% on room air,ratio of arterial partial pressure of oxygen to fraction of inspired oxygen (PaO2/FiO2) less than 300 mmHg, or lung infiltratesgreater than 50%.  •Critical Illness: Individuals who have respiratory failure, septic shock, and/or multiple organ dysfunction.    Patient meets criteria for severe disease    Prognostic factors associated with increased risk of mortality include age >50, Neutrophil:Lymphocyte ratio >5, Procalcitonin > 0.2, Ferritin >850, CRP >6, and D-Dimer >1000 (Sharad et al, Lancet, Mach 2020). It should be said that the lab values in/of themselves are nonspecific in nature and can be abnormal for reasons other than SARS-CoV-2 infection. The likelihood of developing severe respiratory difficulty appears to increase in the second week after developing presenting symptoms.    Patient with mixed inflammatory markers.    He is not a candidate for tocilizumab at this time given absence of respiratory failure.  The use of doxycycline, hydroxychloroquine, azithromycin, ivermectin, or colchicine is not recommended.     Remdesivir has not been consistently shown to impact mortality. Thus far it has been shown to accomplish is decrease the length of hospitalization in patients with severe disease. In patient with moderate disease data showed clinical improvement in patient treated with 5 days of remdesivir, but not those treated for 10 days.    Criteria for use include:  • SpO2 < 94% on room air, OR requiring supplemental oxygen, OR requiring invasive mechanical ventilation, OR requiring ECMO (e.g moderate to critical disease)  • eGFR > 30 mL/min  • ALT < 5X ULN    Contraindications  • Use during pregnancy unless the potential benefits justify the potential risk for the mother and the fetus.  • Remdesivir should not be initiated in patients with ALT greater than or equal to 5 times the upper limit of normal (ULN) of baseline.  • Use in patients with renal impairment is based on potential risk/benefit considerations.  Remdesivir Dosing  • Adult patients greater than or equal to 40 k mG IV x 1 dose on day 1, followed by 100 mG IV q24h.  • Administration of Remdesivir in patients with eGFR less than 30 mL/min should be considered if the potential benefits outweigh the potential risks. There is a potential accumulation of cyclodextrin excipient found in Remdesivir.  Treatment Duration  • Mechanical ventilation and/or ECMO, duration is 10 days of treatment.  • Not requiring mechanical ventilation or ECMO, duration is 5 days of treatment.       If patient does not demonstrate clinical improvement, treatment may be extended for a total of 10 days if clinically indicated.  • Patients do not need to complete the course if they are stable for discharge.  Monitoring Parameters  • Daily renal and hepatic monitoring should be performed while on therapy       Discontinue therapy if patient is asymptomatic with ALT greater than or equal to 5 times the ULN, restart once ALT less than 5 times the ULN.       Discontinue therapy if ALT elevation is accompanied by signs or symptoms of liver inflammation or increasing conjugated bilirubin, alkaline phosphatase, or INR.  • Pregnancy Test  • Infusion-related reactions have been reported       Discontinue infusion and administer appropriate treatment.  In a large study, dexamethasone reduced  mortality reduced by 17% when adjusted for age/risk.    NIH recommendations on which patients should receive dexamethasone:  • In patients with severe COVID-19 who required oxygen support, the use of dexamethasone 6 mG daily for up to 10 days reduced mortality at 28 days in a preliminary analysis.  • The benefit of dexamethasone was most apparent in hospitalized patients who were mechanically ventilated. In other subgroup analysis the mortality reduction in ECMO or ventilator patients was 35% and in individuals requiring supplemental oxygen only mortality was decreased by 20%. However while those decreases are significant, it should be noted that mortality remained high in both groups (29% and 21.3%, respectively).   • There was no observed benefit of dexamethasone in patients who did not require oxygen support.  In subgroup analysis there was a trend toward higher mortality in patients who did not require oxygen or ventilatory support though it did not reach statistical signficance.     Monitoring, Adverse Effects, and Drug-Drug Interactions:  • Clinicians should closely monitor patients with COVID-19 who are receiving dexamethasone for adverse effects (e.g., hyperglycemia, secondary infections, psychiatric effects, avascular necrosis).  • Prolonged use of systemic corticosteroids may increase the risk of reactivation of latent infections (e.g., hepatitis B virus (HBV), herpesvirus infections, strongyloidiasis, tuberculosis).  • The risk of reactivation of latent infections for a 10-day course of dexamethasone (6 mG once daily) is not well-defined. When initiating dexamethasone, appropriate screening and treatment to reduce the risk of strongyloides superinfection in patients at high risk of strongyloidiasis (e.g. patients from tropical, subtropical, or warm, temperate regions or those engaged in agricultural activities) or fulminant reactivations of HBV should be considered.  • Dexamethasone should be continued for up to 10 days or until hospital discharge, whichever comes first.  Alternative medication:  • In case of dexamethasone shortage alternative medications may include methylprednisolone 32mG and prednisone 40mG.    Suggestions--  Contine RDV/Dex  Precautions per protocol, ideally airborne and contact in negative pressure room  Supplemental oxygen as required to maintain adequate saturation.  Avoid NIPPV, high flow O2, nebulilzers, or other interventions which may increase the likelihood of aerosolization as much as feasible  High threshold for antibiotic use  Vigilance for secondary infection and other superimposed events  Monitor inflammatory markers and lab data  DVT prophylaxis per protocol.  RDV x5 days  Trend creatinine and ALT on RDV  Dexamethasone [id number]  Good but not overly tight diabetic control to avoid iatrogenic hypoglycemia.   Thank you for the courtesy of this referral.   Papa Frye MD  Attending Physician  [facility]  Division of Infectious Diseases  [phone]

## 2021-03-16 VITALS — WEIGHT: 255.3 LBS

## 2021-03-16 LAB
ALBUMIN SERPL ELPH-MCNC: 2.9 G/DL — LOW (ref 3.3–5)
ALP SERPL-CCNC: 50 U/L — SIGNIFICANT CHANGE UP (ref 40–120)
ALT FLD-CCNC: 41 U/L — SIGNIFICANT CHANGE UP (ref 12–78)
ANION GAP SERPL CALC-SCNC: 8 MMOL/L — SIGNIFICANT CHANGE UP (ref 5–17)
AST SERPL-CCNC: 45 U/L — HIGH (ref 15–37)
BILIRUB DIRECT SERPL-MCNC: 0.12 MG/DL — SIGNIFICANT CHANGE UP (ref 0.05–0.2)
BILIRUB INDIRECT FLD-MCNC: 0.4 MG/DL — SIGNIFICANT CHANGE UP (ref 0.2–1)
BILIRUB SERPL-MCNC: 0.5 MG/DL — SIGNIFICANT CHANGE UP (ref 0.2–1.2)
BUN SERPL-MCNC: 18 MG/DL — SIGNIFICANT CHANGE UP (ref 7–23)
CALCIUM SERPL-MCNC: 8.8 MG/DL — SIGNIFICANT CHANGE UP (ref 8.5–10.1)
CHLORIDE SERPL-SCNC: 102 MMOL/L — SIGNIFICANT CHANGE UP (ref 96–108)
CO2 SERPL-SCNC: 26 MMOL/L — SIGNIFICANT CHANGE UP (ref 22–31)
CREAT SERPL-MCNC: 1.16 MG/DL — SIGNIFICANT CHANGE UP (ref 0.5–1.3)
CREAT SERPL-MCNC: 1.17 MG/DL — SIGNIFICANT CHANGE UP (ref 0.5–1.3)
CRP SERPL-MCNC: 37 MG/L — HIGH
D DIMER BLD IA.RAPID-MCNC: 283 NG/ML DDU — HIGH
FERRITIN SERPL-MCNC: 1095 NG/ML — HIGH (ref 30–400)
GLUCOSE BLDC GLUCOMTR-MCNC: 272 MG/DL — HIGH (ref 70–99)
GLUCOSE BLDC GLUCOMTR-MCNC: 277 MG/DL — HIGH (ref 70–99)
GLUCOSE SERPL-MCNC: 273 MG/DL — HIGH (ref 70–99)
HCT VFR BLD CALC: 32.8 % — LOW (ref 39–50)
HGB BLD-MCNC: 11.7 G/DL — LOW (ref 13–17)
INR BLD: 1.39 RATIO — HIGH (ref 0.88–1.16)
LDH SERPL L TO P-CCNC: 533 U/L — HIGH (ref 50–242)
MAGNESIUM SERPL-MCNC: 2.1 MG/DL — SIGNIFICANT CHANGE UP (ref 1.6–2.6)
MCHC RBC-ENTMCNC: 29.5 PG — SIGNIFICANT CHANGE UP (ref 27–34)
MCHC RBC-ENTMCNC: 35.7 GM/DL — SIGNIFICANT CHANGE UP (ref 32–36)
MCV RBC AUTO: 82.6 FL — SIGNIFICANT CHANGE UP (ref 80–100)
NRBC # BLD: 0 /100 WBCS — SIGNIFICANT CHANGE UP (ref 0–0)
NT-PROBNP SERPL-SCNC: 216 PG/ML — HIGH (ref 0–125)
PHOSPHATE SERPL-MCNC: 2.7 MG/DL — SIGNIFICANT CHANGE UP (ref 2.5–4.5)
PLATELET # BLD AUTO: 358 K/UL — SIGNIFICANT CHANGE UP (ref 150–400)
POTASSIUM SERPL-MCNC: 3.6 MMOL/L — SIGNIFICANT CHANGE UP (ref 3.5–5.3)
POTASSIUM SERPL-SCNC: 3.6 MMOL/L — SIGNIFICANT CHANGE UP (ref 3.5–5.3)
PROT SERPL-MCNC: 7.1 GM/DL — SIGNIFICANT CHANGE UP (ref 6–8.3)
PROTHROM AB SERPL-ACNC: 15.9 SEC — HIGH (ref 10.6–13.6)
RBC # BLD: 3.97 M/UL — LOW (ref 4.2–5.8)
RBC # FLD: 13.3 % — SIGNIFICANT CHANGE UP (ref 10.3–14.5)
SODIUM SERPL-SCNC: 136 MMOL/L — SIGNIFICANT CHANGE UP (ref 135–145)
WBC # BLD: 10.71 K/UL — HIGH (ref 3.8–10.5)
WBC # FLD AUTO: 10.71 K/UL — HIGH (ref 3.8–10.5)

## 2021-03-16 PROCEDURE — 99232 SBSQ HOSP IP/OBS MODERATE 35: CPT

## 2021-03-16 RX ADMIN — Medication 6: at 11:18

## 2021-03-16 RX ADMIN — Medication 6 MILLIGRAM(S): at 06:10

## 2021-03-16 RX ADMIN — Medication 137 MICROGRAM(S): at 06:10

## 2021-03-16 RX ADMIN — ENOXAPARIN SODIUM 40 MILLIGRAM(S): 100 INJECTION SUBCUTANEOUS at 06:10

## 2021-03-16 RX ADMIN — Medication 6: at 08:25

## 2021-03-16 RX ADMIN — PANTOPRAZOLE SODIUM 40 MILLIGRAM(S): 20 TABLET, DELAYED RELEASE ORAL at 06:10

## 2021-03-16 NOTE — PROGRESS NOTE ADULT - PROBLEM SELECTOR PLAN 1
COVID protocol  IV Dexamethasone, Remdesivir  O2 support  Following markers  Monitoring

## 2021-03-16 NOTE — STUDENT SIGN OFF DOCUMENT - DOCUMENTS STUDENTS ARE SIGNED OFF ON
Patient:   CARMENZA MCGEE            MRN: [id number]            FIN: [id number]               Age:   79 years     Sex:  FEMALE     :  40   Associated Diagnoses:   Aphasia; CVA (cerebral vascular accident); Diabetic on insulin; Dizziness; Hypertension; Vomiting   Author:   TC MKUHERJEE      Basic Information   Face to Face Encounter:  Date & Time 17 12:28:00.    Diagnosis: Aphasia (CSP15-SR R47.01, Diagnosis, Hospitalized, Medical), CVA (cerebral vascular accident) (QWT09-EP I63.9, Diagnosis, Hospitalized, Medical), Diabetic on insulin (DMF10-OA Z79.4, Diagnosis, Hospitalized, Medical), Complaint of Dizziness (PNED [id number], Reason For Visit, Emergency medicine, Medical), Hypertension ([id number] I10, Diagnosis, Hospitalized, Medical), Vomiting (AHV82-JB R11.10, Diagnosis, Hospitalized, Medical).   .    Homebound Status:  This patient is homebound because an illness or injury renders him/her normally unable to leave home except with the assistance of another individual and leaving the home requires a considerable and taxing effort. Pt presents.    Home Care Interventions/Plan:  Skilled Care, Skilled Assessment and Teaching Oral Medication.         Education: Patient/Caregiver education, Disease Management.         Physicial Therapy: Gait Training, Strengthening.    Outpatient Managing Physician:  TC MUKHERJEE.    Certification:  I certify that at the time of this face to face encounter this patient was under my care and that I have initiated a plan of care for Home Health Services for the reasons stated above.       Results Review   Results review        Labs between:  17-DEC-2017 12:28 to 18-DEC-2017 12:28    POC GLU:                 Latest Result  Latest Date  Minimum  Min Date  Maximum  Max Date                             (H) 124  18-DEC-2017 (H) 124  18-DEC-2017 (H) 142  17-DEC-2017                    Functional Status   Reviewed patient's functional  status   With the patient      Progress towards goals   Making gains towards functional goals      Participation in therapy   Participates in intensive therapy         Objective   VS/Measurements        Vitals between:   17-DEC-2017 12:28:12   TO   18-DEC-2017 12:28:12                   LAST RESULT MINIMUM MAXIMUM  Temperature 36.2 36.2 36.5  Heart Rate 59 59 74  Respiratory Rate 16 16 16  NISBP           123 97 146  NIDBP           76 60 81  NIMBP           98 98 98  SpO2                    99 94 99     General:  Alert and oriented, No acute distress.    Eye:  Pupils are equal, round and reactive to light, Normal conjunctiva.    HENT:  Normocephalic.    Neck:  Supple, Non-tender, No carotid bruit, No jugular venous distention, No lymphadenopathy.    Respiratory:  Lungs are clear to auscultation, Respirations are non-labored, Breath sounds are equal.    Cardiovascular:  Normal rate, No murmur, Good pulses equal in all extremities.    Gastrointestinal:  Soft, Non-tender, No organomegaly, No masses.    Genitourinary:  No costovertebral angle tenderness, No inguinal tenderness.    Lymphatics:  No lymphadenopathy neck, axilla, groin.    Integumentary:  Warm, Moist.    Mental status/ Cognition   Alert.               Electronically Signed On 12/18/2017 12:29  __________________________________________________   TC MUKHERJEE     Plan of Care Dietitian Initial Evaluation

## 2021-03-16 NOTE — PROGRESS NOTE ADULT - PROBLEM SELECTOR PLAN 3
FS with Insulin and sliding scale  Lantus started
FS with Insulin and sliding scale
FS with Insulin and sliding scale  Lantus started

## 2021-03-16 NOTE — DIETITIAN INITIAL EVALUATION ADULT. - PERTINENT MEDS FT
MEDICATIONS  (STANDING):  dexAMETHasone     Tablet 6 milliGRAM(s) Oral daily  dextrose 40% Gel 15 Gram(s) Oral once  dextrose 5%. 1000 milliLiter(s) (50 mL/Hr) IV Continuous <Continuous>  dextrose 5%. 1000 milliLiter(s) (100 mL/Hr) IV Continuous <Continuous>  dextrose 50% Injectable 25 Gram(s) IV Push once  dextrose 50% Injectable 12.5 Gram(s) IV Push once  dextrose 50% Injectable 25 Gram(s) IV Push once  enoxaparin Injectable 40 milliGRAM(s) SubCutaneous every 12 hours  glucagon  Injectable 1 milliGRAM(s) IntraMuscular once  insulin glargine Injectable (LANTUS) 10 Unit(s) SubCutaneous at bedtime  insulin lispro (ADMELOG) corrective regimen sliding scale   SubCutaneous three times a day before meals  levothyroxine 137 MICROGram(s) Oral daily  pantoprazole    Tablet 40 milliGRAM(s) Oral before breakfast  remdesivir  IVPB   IV Intermittent   remdesivir  IVPB 100 milliGRAM(s) IV Intermittent every 24 hours    MEDICATIONS  (PRN):  acetaminophen   Tablet .. 650 milliGRAM(s) Oral every 4 hours PRN Temp greater or equal to 38.5C (101.3F)  ALBUTerol    90 MICROgram(s) HFA Inhaler 2 Puff(s) Inhalation every 4 hours PRN Shortness of Breath and/or Wheezing  guaifenesin/dextromethorphan  Syrup 10 milliLiter(s) Oral every 4 hours PRN Cough

## 2021-03-16 NOTE — DIETITIAN INITIAL EVALUATION ADULT. - OTHER INFO
Pt admitted w/ COVID 19 hypoxia PNA; PMH of DM2; pt on metformin PTA. Pt c good appetite during hospitalization; consuming 80% of all documented meals. No reports of N/V/D/C or chew/swallowing difficulty. Pt reported a 40# weight gain ; unsure of time frame; #. Last HbA1C was 8.3% (03/14) indicates poor glucose management. Pt educated on CHO control diet with meal planning with plate method. Handout left c pt. Emphasize portion control. Pt made aware RD remains available. Pt w/ c/o of SBO, weakness, found w/ COVID 19 hypoxia PNA. PMHx of DM2 (on metformin PTA) Pt c good appetite during hospitalization; consuming 80% of all documented meals. No reports of N/V/D/C or chew/swallowing difficulty. Pt reported a 40# weight gain; however, unsure of time frame; #. Last HbA1C was 8.3% (03/14) indicates poor glucose management. Pt educated on CHO control diet with meal planning with plate method. Handout left c pt. Emphasize portion control. Pt made aware RD remains available. pt unaware of A1C PTA; was on metformin PTA; pt states his diabetes was under good control PTA. Pt c good appetite during hospitalization; consuming 80% of all documented meals. No reports of N/V/D/C or chew/swallowing difficulty. Weight gain PTA reported; #.  Pt educated on CHO control diet with meal planning with plate method. Handout left c pt. Discussed A1C goal. Emphasized portion control. Pt made aware RD remains available.

## 2021-03-16 NOTE — DISCHARGE NOTE NURSING/CASE MANAGEMENT/SOCIAL WORK - PATIENT PORTAL LINK FT
You can access the FollowMyHealth Patient Portal offered by [facility] by registering at the following website: [email or web address]. By joining [employer]’s FollowMyHealth portal, you will also be able to view your health information using other applications (apps) compatible with our system.

## 2021-03-16 NOTE — PROGRESS NOTE ADULT - ATTENDING COMMENTS
Continue current care and treatment.
Pulmonary following  Continue current care and treatment.
Pulmonary following  Discharge home  Clinically improved  Continue current care and treatment.

## 2021-03-16 NOTE — DIETITIAN INITIAL EVALUATION ADULT. - ORAL INTAKE PTA/DIET HISTORY
pt reported good appetite PTA; consuming 3 meals per day. NKFA. Pt lives c spouse, who does the cooking and shopping at home. Pt reported good appetite PTA; consuming 3 meals per day. NKFA. Pt reported good appetite PTA; consuming 3 meals per day. Diet PTA CHO control.

## 2021-03-16 NOTE — DIETITIAN INITIAL EVALUATION ADULT. - ETIOLOGY
physiological needs (DM2) requiring modified CHO intake food and nutrition related knowledge deficit concerning how to make nutrition related changes

## 2021-03-16 NOTE — DISCHARGE NOTE PROVIDER - NSDCMRMEDTOKEN_GEN_ALL_CORE_FT
levothyroxine 137 mcg (0.137 mg) oral tablet: 1 tab(s) orally once a day  Medrol Dosepak 4 mg oral tablet: follow instructions on box

## 2021-03-16 NOTE — PROGRESS NOTE ADULT - ASSESSMENT
Admitted for Acute Hypoxic Respiratory Failure 2/2 COVID PNA and DM2.
Admitted for Acute Hypoxic Respiratory Failure 2/2 COVID PNA and DM2.
COVID-19 with pneumonia, hypoxemia, Rapidly improved down to NC. He is not a candidate for Tocilizumab at this time.    03/16: Doing well with resolution of increased FiO2 requirement. No concern of uncontrolled infection on exam. No need to keep patient here to give dose of remdesivir, much less complete course of Rx.     Suggestions--  Precautions per protocol after discharge  DVT prophylaxis per protocol.    No ID objection to outpatient management  Thank you for the courtesy of this referral.  I'll sign off at this time.    Papa Frye MD  Attending Physician  [facility]  Division of Infectious Diseases  [phone]  
Admitted for Acute Hypoxic Respiratory Failure 2/2 COVID PNA and DM2.

## 2021-03-16 NOTE — PROGRESS NOTE ADULT - SUBJECTIVE AND OBJECTIVE BOX
INTERVAL HPI:  59yo, BM with Obesity, DM2 presents  c/o one week h/o flu like symptoms, associated with  dry cough, severe body aches,  fevers and poor appetite. Today  pt reports having chest tightness and persistent cough.  Also noted increasing SOB.  Had + COVID test on Thursday.   In ER, O2 sat 88%, placed on NRB with improvement.  (13 Mar 2021 16:03)    OVERNIGHT EVENTS:  Awake and comfortable    Vital Signs Last 24 Hrs  T(C): 36.5 (15 Mar 2021 11:12), Max: 37.1 (15 Mar 2021 05:03)  T(F): 97.7 (15 Mar 2021 11:12), Max: 98.7 (15 Mar 2021 05:03)  HR: 85 (15 Mar 2021 11:12) (72 - 85)  BP: 127/81 (15 Mar 2021 11:12) (114/68 - 127/81)  BP(mean): --  RR: 18 (15 Mar 2021 11:12) (15 - 20)  SpO2: 96% (15 Mar 2021 11:12) (94% - 98%)    PHYSICAL EXAM:  GEN:         Awake, responsive and comfortable.  HEENT:    Normal.    RESP:        no distress  CVS:             Regular rate and rhythm.   ABD:         Soft, non-tender, non-distended;     MEDICATIONS  (STANDING):  dexAMETHasone     Tablet 6 milliGRAM(s) Oral daily  dextrose 40% Gel 15 Gram(s) Oral once  dextrose 5%. 1000 milliLiter(s) (50 mL/Hr) IV Continuous <Continuous>  dextrose 5%. 1000 milliLiter(s) (100 mL/Hr) IV Continuous <Continuous>  dextrose 50% Injectable 25 Gram(s) IV Push once  dextrose 50% Injectable 12.5 Gram(s) IV Push once  dextrose 50% Injectable 25 Gram(s) IV Push once  enoxaparin Injectable 40 milliGRAM(s) SubCutaneous every 12 hours  glucagon  Injectable 1 milliGRAM(s) IntraMuscular once  insulin glargine Injectable (LANTUS) 10 Unit(s) SubCutaneous at bedtime  insulin lispro (ADMELOG) corrective regimen sliding scale   SubCutaneous three times a day before meals  levothyroxine 137 MICROGram(s) Oral daily  pantoprazole    Tablet 40 milliGRAM(s) Oral before breakfast  remdesivir  IVPB   IV Intermittent   remdesivir  IVPB 100 milliGRAM(s) IV Intermittent every 24 hours  sodium chloride 0.9%. 1000 milliLiter(s) (75 mL/Hr) IV Continuous <Continuous>    MEDICATIONS  (PRN):  acetaminophen   Tablet .. 650 milliGRAM(s) Oral every 4 hours PRN Temp greater or equal to 38.5C (101.3F)  ALBUTerol    90 MICROgram(s) HFA Inhaler 2 Puff(s) Inhalation every 4 hours PRN Shortness of Breath and/or Wheezing  guaifenesin/dextromethorphan  Syrup 10 milliLiter(s) Oral every 4 hours PRN Cough    LABS:                        10.9   9.81  )-----------( 334      ( 15 Mar 2021 08:09 )             31.5     03-15    136  |  102  |  17  ----------------------------<  265<H>  3.7   |  26  |  1.24    Ca    8.7      15 Mar 2021 08:09    TPro  7.2  /  Alb  2.9<L>  /  TBili  0.4  /  DBili  .14  /  AST  51<H>  /  ALT  40  /  AlkPhos  47  03-15    PT/INR - ( 15 Mar 2021 08:09 )   PT: 16.0 sec;   INR: 1.40 ratio    ASSESSMENT AND PLAN:  ·	Acute hypoxic Respiratory failure.  ·	COVID Pneumonia.  ·	DM.  ·	Obesity     Clinically better, Continue O2.  On Remdesivir and Dexamethasone.  May DC IV fluids after today.      
[facility]  Division of Infectious Diseases  [phone]    Name: MARIE GALEANA  Age: 60y  Gender: Male  MRN: [id number]    Interval History--  Notes reviewed. Off o2. Feels back to baseline. No fevers, chills, or rigors. Denies CP or SOB. No cough. No other complaints. IV access lost, dose of RDV delayed. For discharge home.     Past Medical History--  Diabetes    No significant past surgical history        For details regarding the patient's social history, family history, and other miscellaneous elements, please refer the initial infectious diseases consultation and/or the admitting history and physical examination for this admission.    Allergies    No Known Allergies    Intolerances        Medications--  Antibiotics:  remdesivir  IVPB   IV Intermittent   remdesivir  IVPB 100 milliGRAM(s) IV Intermittent every 24 hours    Immunologic:    Other:  acetaminophen   Tablet .. PRN  ALBUTerol    90 MICROgram(s) HFA Inhaler PRN  dexAMETHasone     Tablet  dextrose 40% Gel  dextrose 5%.  dextrose 5%.  dextrose 50% Injectable  dextrose 50% Injectable  dextrose 50% Injectable  enoxaparin Injectable  glucagon  Injectable  guaifenesin/dextromethorphan  Syrup PRN  insulin glargine Injectable (LANTUS)  insulin lispro (ADMELOG) corrective regimen sliding scale  levothyroxine  pantoprazole    Tablet      Review of Systems--  A 10-point review of systems was obtained.   Review of systems otherwise negative except as previously noted.    Physical Examination--  Vital Signs: T(F): 98.1 (03-16-21 @ 11:10), Max: 98.1 (03-15-21 @ 16:55)  HR: 76 (03-16-21 @ 11:10)  BP: 133/86 (03-16-21 @ 11:10)  RR: 18 (03-16-21 @ 11:10)  SpO2: 93% (03-16-21 @ 11:10)  Wt(kg): --  General: Nontoxic-appearing Male in no acute distress.  HEENT: AT/NC. Anicteric. Conjunctiva pink and moist.  Neck: Not rigid. No sense of mass.  Nodes: None palpable.  Lungs: Diminsihed BS bilaterally without rales, wheezing or rhonchi  Heart: Regular rate and rhythm. No Murmur. No rub. No gallop. No palpable thrill.  Abdomen: Bowel sounds present and normoactive. Soft. Nondistended. Nontender. No sense of mass. No organomegaly.  Extremities: No cyanosis or clubbing. No edema.   Skin: Warm. Dry. Good turgor. No rash. No vasculitic stigmata.  Psychiatric: Appropriate affect and mood for situation.         Laboratory Studies--  CBC                        11.7   10.71 )-----------( 358      ( 16 Mar 2021 08:02 )             32.8       Chemistries  03-16    136  |  102  |  18  ----------------------------<  273<H>  3.6   |  26  |  1.17    Ca    8.8      16 Mar 2021 08:02  Phos  2.7     03-16  Mg     2.1     03-16    TPro  7.1  /  Alb  2.9<L>  /  TBili  0.5  /  DBili  .12  /  AST  45<H>  /  ALT  41  /  AlkPhos  50  03-16    COVID-related labs  Neutrophil and Lymphocyte count (NLR <3 vs. >5, better vs. worse prognosis)  Auto Neutrophil #: 2.89 K/uL (03-13-21 @ 10:27)  Auto Lymphocyte #: 0.98 K/uL (03-13-21 @ 10:27)      Procalcitonin (<0.2, worse prognosis)  0.14 ng/mL (03-13-21 @ 14:06)      Ferritin (<450 vs. >850, better vs. worse prognosis)  1095 ng/mL (03-16-21 @ 11:14)  1202 ng/mL (03-15-21 @ 11:49)  1112 ng/mL (03-14-21 @ 11:22)  1047 ng/mL (03-14-21 @ 00:28)  1021 ng/mL (03-13-21 @ 14:08)      CRP (<20 mg/dL vs. >60 mg/dL , better vs. worse prognosis)  37 mg/L (03-16-21 @ 12:27)  73 mg/L (03-15-21 @ 11:50)  138 mg/L (03-14-21 @ 11:17)  150 mg/L (03-14-21 @ 00:20)  144 mg/L (03-13-21 @ 14:06)      D-dimer (<1000 vs. > 1000, better vs. worse prognosis)  283 ng/mL DDU (03-16-21 @ 08:02)  257 ng/mL DDU (03-14-21 @ 07:15)  186 ng/mL DDU (03-13-21 @ 16:13)  191 ng/mL DDU (03-13-21 @ 10:27)      Creatinine/estGFR  Creatinine, Serum: 1.17 mg/dL (03-16-21 @ 08:02)  eGFR if Non African American: 68 mL/min/1.73M2 (03-16-21 @ 08:02)  eGFR if : 79 mL/min/1.73M2 (03-16-21 @ 08:02)  Creatinine, Serum: 1.16 mg/dL (03-16-21 @ 08:02)  eGFR if Non : 69 mL/min/1.73M2 (03-16-21 @ 08:02)  eGFR if : 79 mL/min/1.73M2 (03-16-21 @ 08:02)  Creatinine, Serum: 1.24 mg/dL (03-15-21 @ 08:09)  eGFR if Non African American: 63 mL/min/1.73M2 (03-15-21 @ 08:09)  eGFR if : 73 mL/min/1.73M2 (03-15-21 @ 08:09)  Creatinine, Serum: 1.12 mg/dL (03-14-21 @ 07:15)  eGFR if Non African American: 72 mL/min/1.73M2 (03-14-21 @ 07:15)  eGFR if African American: 83 mL/min/1.73M2 (03-14-21 @ 07:15)  Creatinine, Serum: 1.15 mg/dL (03-14-21 @ 07:15)  eGFR if Non : 69 mL/min/1.73M2 (03-14-21 @ 07:15)  eGFR if African American: 80 mL/min/1.73M2 (03-14-21 @ 07:15)  Creatinine, Serum: 1.32 mg/dL (03-13-21 @ 16:12)  eGFR if Non African American: 59 mL/min/1.73M2 (03-13-21 @ 16:12)  eGFR if : 68 mL/min/1.73M2 (03-13-21 @ 16:12)  Creatinine, Serum: 1.24 mg/dL (03-13-21 @ 10:27)  eGFR if Non African American: 63 mL/min/1.73M2 (03-13-21 @ 10:27)  eGFR if : 73 mL/min/1.73M2 (03-13-21 @ 10:27)      ALT  41 U/L (03-16-21 @ 08:02)  40 U/L (03-15-21 @ 08:09)  45 U/L (03-14-21 @ 07:15)  42 U/L (03-14-21 @ 07:15)  42 U/L (03-13-21 @ 16:12)  43 U/L (03-13-21 @ 10:27)      Culture Data  No new data        
Patient is a 60y old  Male who presents with a chief complaint of Shortness of breath (14 Mar 2021 12:13)      SUBJECTIVE/OBJECTIVE:    Without complaints. Patient resting comfortably.   O2 sat dropped last night and corrected in prone position     MEDICATIONS  (STANDING):  dexAMETHasone     Tablet 6 milliGRAM(s) Oral daily  dextrose 40% Gel 15 Gram(s) Oral once  dextrose 5%. 1000 milliLiter(s) (50 mL/Hr) IV Continuous <Continuous>  dextrose 5%. 1000 milliLiter(s) (100 mL/Hr) IV Continuous <Continuous>  dextrose 50% Injectable 25 Gram(s) IV Push once  dextrose 50% Injectable 12.5 Gram(s) IV Push once  dextrose 50% Injectable 25 Gram(s) IV Push once  enoxaparin Injectable 40 milliGRAM(s) SubCutaneous every 12 hours  glucagon  Injectable 1 milliGRAM(s) IntraMuscular once  insulin glargine Injectable (LANTUS) 10 Unit(s) SubCutaneous at bedtime  insulin lispro (ADMELOG) corrective regimen sliding scale   SubCutaneous three times a day before meals  levothyroxine 137 MICROGram(s) Oral daily  pantoprazole    Tablet 40 milliGRAM(s) Oral before breakfast  remdesivir  IVPB   IV Intermittent   remdesivir  IVPB 100 milliGRAM(s) IV Intermittent every 24 hours  sodium chloride 0.9%. 1000 milliLiter(s) (75 mL/Hr) IV Continuous <Continuous>    MEDICATIONS  (PRN):  acetaminophen   Tablet .. 650 milliGRAM(s) Oral every 4 hours PRN Temp greater or equal to 38.5C (101.3F)  ALBUTerol    90 MICROgram(s) HFA Inhaler 2 Puff(s) Inhalation every 4 hours PRN Shortness of Breath and/or Wheezing  guaifenesin/dextromethorphan  Syrup 10 milliLiter(s) Oral every 4 hours PRN Cough      Allergies    No Known Allergies    Intolerances          Vital Signs Last 24 Hrs  T(C): 36.5 (15 Mar 2021 11:12), Max: 37.1 (15 Mar 2021 05:03)  T(F): 97.7 (15 Mar 2021 11:12), Max: 98.7 (15 Mar 2021 05:03)  HR: 85 (15 Mar 2021 11:12) (72 - 85)  BP: 127/81 (15 Mar 2021 11:12) (114/68 - 127/81)  BP(mean): --  RR: 18 (15 Mar 2021 11:12) (15 - 20)  SpO2: 96% (15 Mar 2021 11:12) (94% - 98%)    PHYSICAL EXAM:  GENERAL: NAD, well-groomed, well-developed  HEAD:  Atraumatic, Normocephalic  EYES: EOMI, PERRLA, conjunctiva and sclera clear  ENMT: No tonsillar erythema, exudates, or enlargement; Moist mucous membranes, No lesions  NECK: Supple, No JVD, Normal thyroid  NERVOUS SYSTEM:  Alert & Oriented X3; Motor Strength 5/5 B/L upper and lower extremities; DTRs 2+ intact and symmetric  CHEST/LUNG: Clear bilaterally; No rales, rhonchi, wheezing, or rubs  HEART: Regular rate and rhythm; No murmurs, rubs, or gallops  ABDOMEN: Soft, Nontender, Nondistended; Bowel sounds present  EXTREMITIES:  2+ Peripheral Pulses, No clubbing, cyanosis, or edema  LYMPH: No lymphadenopathy noted  SKIN: No rashes or lesions    LABS:                        10.9   9.81  )-----------( 334      ( 15 Mar 2021 08:09 )             31.5     03-15    136  |  102  |  17  ----------------------------<  265<H>  3.7   |  26  |  1.24    Ca    8.7      15 Mar 2021 08:09    TPro  7.2  /  Alb  2.9<L>  /  TBili  0.4  /  DBili  .14  /  AST  51<H>  /  ALT  40  /  AlkPhos  47  03-15    PT/INR - ( 15 Mar 2021 08:09 )   PT: 16.0 sec;   INR: 1.40 ratio             CAPILLARY BLOOD GLUCOSE      POCT Blood Glucose.: 347 mg/dL (15 Mar 2021 10:59)  POCT Blood Glucose.: 307 mg/dL (15 Mar 2021 10:57)  POCT Blood Glucose.: 277 mg/dL (15 Mar 2021 08:03)  POCT Blood Glucose.: 277 mg/dL (14 Mar 2021 20:56)  POCT Blood Glucose.: 275 mg/dL (14 Mar 2021 16:28)          RADIOLOGY & ADDITIONAL TESTS:        Consultant(s) Notes Reviewed:  [ ] YES  [ ] NO  
Patient is a 60y old  Male who presents with a chief complaint of Shortness of breath (13 Mar 2021 22:46)      SUBJECTIVE/OBJECTIVE:    Without complaints. Patient resting comfortably.     MEDICATIONS  (STANDING):  dexAMETHasone  Injectable 6 milliGRAM(s) IV Push daily  dextrose 40% Gel 15 Gram(s) Oral once  dextrose 5%. 1000 milliLiter(s) (50 mL/Hr) IV Continuous <Continuous>  dextrose 5%. 1000 milliLiter(s) (100 mL/Hr) IV Continuous <Continuous>  dextrose 50% Injectable 25 Gram(s) IV Push once  dextrose 50% Injectable 12.5 Gram(s) IV Push once  dextrose 50% Injectable 25 Gram(s) IV Push once  enoxaparin Injectable 40 milliGRAM(s) SubCutaneous every 12 hours  glucagon  Injectable 1 milliGRAM(s) IntraMuscular once  insulin lispro (ADMELOG) corrective regimen sliding scale   SubCutaneous three times a day before meals  pantoprazole    Tablet 40 milliGRAM(s) Oral before breakfast  remdesivir  IVPB   IV Intermittent   remdesivir  IVPB 100 milliGRAM(s) IV Intermittent every 24 hours  sodium chloride 0.9%. 1000 milliLiter(s) (75 mL/Hr) IV Continuous <Continuous>    MEDICATIONS  (PRN):  acetaminophen   Tablet .. 650 milliGRAM(s) Oral every 4 hours PRN Temp greater or equal to 38.5C (101.3F)  ALBUTerol    90 MICROgram(s) HFA Inhaler 2 Puff(s) Inhalation every 4 hours PRN Shortness of Breath and/or Wheezing  guaifenesin/dextromethorphan  Syrup 10 milliLiter(s) Oral every 4 hours PRN Cough      Allergies    No Known Allergies    Intolerances          Vital Signs Last 24 Hrs  T(C): 36.7 (14 Mar 2021 11:30), Max: 37.7 (13 Mar 2021 12:41)  T(F): 98 (14 Mar 2021 11:30), Max: 99.8 (13 Mar 2021 12:41)  HR: 75 (14 Mar 2021 11:30) (69 - 98)  BP: 128/87 (14 Mar 2021 11:30) (106/62 - 128/87)  BP(mean): --  RR: 22 (14 Mar 2021 04:46) (18 - 22)  SpO2: 92% (14 Mar 2021 11:30) (92% - 100%)    Physical Exam:   Physical Exam: PHYSICAL EXAM:  GENERAL: NAD, well-groomed, well-developed  HEAD:  Atraumatic, Normocephalic  EYES: PERRLA, conjunctiva and sclera clear  ENMT: Intact   NECK: Supple,   NERVOUS SYSTEM:  Alert & Oriented X3; Motor Strength 5/5   CHEST/LUNG: Crackles base bilaterally; No rales, rhonchi, wheezing, or rubs  HEART: Regular rate and rhythm; No murmurs, rubs, or gallops  ABDOMEN: Soft, Nontender, Nondistended; Bowel sounds present  EXTREMITIES:   No clubbing, cyanosis, or edema  SKIN: No rashes or lesions      LABS:                        11.0   4.73  )-----------( 264      ( 14 Mar 2021 07:15 )             32.0     03-14    138  |  103  |  15  ----------------------------<  249<H>  4.1   |  28  |  1.12    Ca    8.6      14 Mar 2021 07:15    TPro  7.6  /  Alb  3.1<L>  /  TBili  0.3  /  DBili  .11  /  AST  68<H>  /  ALT  45  /  AlkPhos  52  03-14    PT/INR - ( 14 Mar 2021 07:15 )   PT: 15.1 sec;   INR: 1.32 ratio             CAPILLARY BLOOD GLUCOSE      POCT Blood Glucose.: 330 mg/dL (14 Mar 2021 11:22)  POCT Blood Glucose.: 271 mg/dL (14 Mar 2021 07:33)  POCT Blood Glucose.: 306 mg/dL (13 Mar 2021 16:39)          RADIOLOGY & ADDITIONAL TESTS:        Consultant(s) Notes Reviewed:  [xx ] YES  [ ] NO  
Patient is a 60y old  Male who presents with a chief complaint of Shortness of breath (15 Mar 2021 14:45)      SUBJECTIVE/OBJECTIVE:    Without complaints. Patient resting comfortably.     MEDICATIONS  (STANDING):  dexAMETHasone     Tablet 6 milliGRAM(s) Oral daily  dextrose 40% Gel 15 Gram(s) Oral once  dextrose 5%. 1000 milliLiter(s) (50 mL/Hr) IV Continuous <Continuous>  dextrose 5%. 1000 milliLiter(s) (100 mL/Hr) IV Continuous <Continuous>  dextrose 50% Injectable 25 Gram(s) IV Push once  dextrose 50% Injectable 12.5 Gram(s) IV Push once  dextrose 50% Injectable 25 Gram(s) IV Push once  enoxaparin Injectable 40 milliGRAM(s) SubCutaneous every 12 hours  glucagon  Injectable 1 milliGRAM(s) IntraMuscular once  insulin glargine Injectable (LANTUS) 10 Unit(s) SubCutaneous at bedtime  insulin lispro (ADMELOG) corrective regimen sliding scale   SubCutaneous three times a day before meals  levothyroxine 137 MICROGram(s) Oral daily  pantoprazole    Tablet 40 milliGRAM(s) Oral before breakfast  remdesivir  IVPB   IV Intermittent   remdesivir  IVPB 100 milliGRAM(s) IV Intermittent every 24 hours    MEDICATIONS  (PRN):  acetaminophen   Tablet .. 650 milliGRAM(s) Oral every 4 hours PRN Temp greater or equal to 38.5C (101.3F)  ALBUTerol    90 MICROgram(s) HFA Inhaler 2 Puff(s) Inhalation every 4 hours PRN Shortness of Breath and/or Wheezing  guaifenesin/dextromethorphan  Syrup 10 milliLiter(s) Oral every 4 hours PRN Cough      Allergies    No Known Allergies    Intolerances          Vital Signs Last 24 Hrs  T(C): 36.7 (16 Mar 2021 05:06), Max: 36.7 (15 Mar 2021 16:55)  T(F): 98 (16 Mar 2021 05:06), Max: 98.1 (15 Mar 2021 16:55)  HR: 74 (16 Mar 2021 05:06) (71 - 85)  BP: 152/86 (16 Mar 2021 05:06) (127/81 - 152/86)  BP(mean): --  RR: 18 (16 Mar 2021 05:06) (18 - 18)  SpO2: 95% (16 Mar 2021 05:06) (95% - 96%)    PHYSICAL EXAM:  GENERAL: NAD, well-groomed, well-developed  HEAD:  Atraumatic, Normocephalic  EYES: EOMI, PERRLA, conjunctiva and sclera clear  ENMT: No tonsillar erythema, exudates, or enlargement; Moist mucous membranes, No lesions  NECK: Supple, No JVD, Normal thyroid  NERVOUS SYSTEM:  Alert & Oriented X3; Motor Strength 5/5 B/L upper and lower extremities; DTRs 2+ intact and symmetric  CHEST/LUNG: Clear bilaterally; No rales, rhonchi, wheezing, or rubs  HEART: Regular rate and rhythm; No murmurs, rubs, or gallops  ABDOMEN: Soft, Nontender, Nondistended; Bowel sounds present  EXTREMITIES:  2+ Peripheral Pulses, No clubbing, cyanosis, or edema  LYMPH: No lymphadenopathy noted  SKIN: No rashes or lesions    LABS:                        11.7   10.71 )-----------( 358      ( 16 Mar 2021 08:02 )             32.8     03-16    136  |  102  |  18  ----------------------------<  273<H>  3.6   |  26  |  1.17    Ca    8.8      16 Mar 2021 08:02  Phos  2.7     03-16  Mg     2.1     03-16    TPro  7.1  /  Alb  2.9<L>  /  TBili  0.5  /  DBili  .12  /  AST  45<H>  /  ALT  41  /  AlkPhos  50  03-16    PT/INR - ( 16 Mar 2021 08:02 )   PT: 15.9 sec;   INR: 1.39 ratio             CAPILLARY BLOOD GLUCOSE      POCT Blood Glucose.: 277 mg/dL (16 Mar 2021 07:32)  POCT Blood Glucose.: 280 mg/dL (15 Mar 2021 21:21)  POCT Blood Glucose.: 267 mg/dL (15 Mar 2021 16:21)  POCT Blood Glucose.: 347 mg/dL (15 Mar 2021 10:59)  POCT Blood Glucose.: 307 mg/dL (15 Mar 2021 10:57)          RADIOLOGY & ADDITIONAL TESTS:        Consultant(s) Notes Reviewed:  [ ] YES  [ ] NO

## 2021-03-16 NOTE — DISCHARGE NOTE PROVIDER - HOSPITAL COURSE
60 YOM with PMHx of DM admitted for Acute Hypoxic Respiratory Failure 2/2 COVID PNA. Treated with remdesivir and on Decadron. Doing well on room air.      Problem/Plan - 1:  ·  Problem: Pneumonia due to COVID-19 virus.  Plan: COVID protocol  IV Dexamethasone, Remdesivir  O2 support  Following markers  Monitoring.      Problem/Plan - 2:  ·  Problem: Acute respiratory failure with hypoxia.  Plan: Pulmonary consult noted;  SPO2 88% on room air in ED, now requiring NRB mask.  Continue Remdesivir and Dexamethasone.  ON BID Lovenox.  Gentle hydration.  O2 support and monitoring.      Problem/Plan - 3:  ·  Problem: Type 2 diabetes mellitus with hyperglycemia, without long-term current use of insulin.  Plan: FS with Insulin and sliding scale  Lantus started.     Continue home meds. Follow up with PMD 2 weeks.

## 2021-03-16 NOTE — PROGRESS NOTE ADULT - PROBLEM SELECTOR PLAN 2
Pulmonary consult noted;  SPO2 88% on room air in ED, now requiring NRB mask.  Continue Remdesivir and Dexamethasone.  ON BID Lovenox.  Gentle hydration.  O2 support and monitoring

## 2021-03-16 NOTE — DIETITIAN INITIAL EVALUATION ADULT. - SIGNS/SYMPTOMS
HbA1C of 8.3%, elevated finger sticks 277mg/dl-348mg/dl (03/15) HbA1C of 8.3%, wt gain w/ BMI = 34.6

## 2021-03-16 NOTE — DISCHARGE NOTE PROVIDER - NSDCCPCAREPLAN_GEN_ALL_CORE_FT
PRINCIPAL DISCHARGE DIAGNOSIS  Diagnosis: COVID-19  Assessment and Plan of Treatment:       SECONDARY DISCHARGE DIAGNOSES  Diagnosis: Pneumonia  Assessment and Plan of Treatment:     Diagnosis: Hypoxia  Assessment and Plan of Treatment:

## 2021-03-16 NOTE — DIETITIAN INITIAL EVALUATION ADULT. - NUTRITIONGOAL OUTCOME1
Pt to verbalize at least one aspect of CHO con. diet to improve glucose levels c a goal of<7% Pt to verbalize portion/CHO control as per plate method and A1C goal <7.0%

## 2021-03-16 NOTE — DIETITIAN INITIAL EVALUATION ADULT. - OTHER CALCULATIONS
Ht (cm):  Wt (kg):  BMI:    IBW:                     %IBW:                      UBW:                   %UBW: Ht (cm): 182.9 cm     Wt (kg): 115.8 kg (03/14)    BMI: 36.4      IBW: 80.9 kg (178#)  %IBW: 143.4%  UBW: 215#    %UBW: 118.8% Ht (cm): 182.9 cm  Wt (kg): 115.8 kg (03/14)    BMI: 34.6      IBW: 80.7 kg (178#)  %IBW: 143.4%  UBW: 97.5 kg (215#)  %UBW: 118.8% , wt gain of 2.4 kg since adm noted

## 2021-03-17 LAB
SARS-COV-2 IGG SERPL QL IA: NEGATIVE — SIGNIFICANT CHANGE UP
SARS-COV-2 IGM SERPL IA-ACNC: 0.24 INDEX — SIGNIFICANT CHANGE UP

## 2021-03-23 DIAGNOSIS — E11.65 TYPE 2 DIABETES MELLITUS WITH HYPERGLYCEMIA: ICD-10-CM

## 2021-03-23 DIAGNOSIS — E66.9 OBESITY, UNSPECIFIED: ICD-10-CM

## 2021-03-23 DIAGNOSIS — J12.82 PNEUMONIA DUE TO CORONAVIRUS DISEASE 2019: ICD-10-CM

## 2021-03-23 DIAGNOSIS — J96.01 ACUTE RESPIRATORY FAILURE WITH HYPOXIA: ICD-10-CM

## 2021-03-23 DIAGNOSIS — U07.1 COVID-19: ICD-10-CM

## 2021-03-23 DIAGNOSIS — Z87.891 PERSONAL HISTORY OF NICOTINE DEPENDENCE: ICD-10-CM

## 2022-04-20 NOTE — ED ADULT NURSE NOTE - NSSEPSISNEWALTERMENTAL_ED_A_ED
Mr. Arrieta is a 64-year-old male with a history of hypertension, hyperlipidemia, prostate cancer, migraines, duodenal adenoma [place] [facility]), and adenomatous colon polyps presenting for hospital follow up.  He was hospitalized for possible food impaction in early March at [place].  He underwent EGD and ENT evaluation with Dr. Rabago 3/13/2022 which revealed nonsevere esophagitis in the upper third of esophagus, single 5 mm polyp in the third portion of duodenum, biopsies were obtained and completely removed, otherwise normal stomach.  The polyp was a duodenal  adenoma.  Apparently he was complaining of difficulties with sinusitis and post nasal drip.  He denies any food impactions, but did have difficulty swallowing his own saliva, pills and liquids.  ENT and Dr. Paez were involved in his care.  He was told he had a throat infection and was given 14 days of fluconazole.  He is currently on Omeprazole 40mg bid and denies any reflux symptoms.  He denies any steroid use or inhalers preceding this event.   Colonoscopy 11/2/20 revealed a normal terminal ileum, few sigmoid diverticula, few angiectasia in rectum, otherwise normal colon and medium sized internal hemorrhoids.  It was recommended to repeat colonoscopy in10 years and perform fecal immunochemical testing testing annually starting in 5 years. EGD 10/29/20 for evaluation of a history of reflux and duodenal lesion revealed a normal esophagus, mild nonerosive gastritis, a polypectomy scar in the 2nd portion of duodenum with no evidence of residual tissue and otherwise normal duodenum.  Duodenal biopsies revealed no significant abnormalities and gastric biopsies were unremarkable.  Prior EGD notable for a 10 mm sessile polyp in the second portion of the duodenum with pathology reporting a tubulovillous adenoma.  He was referred to the [facility] for endoscopic mucosal resection.  He underwent endoscopic mucosal resection at [facility] in December [place].  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 No

## 2022-04-25 NOTE — ED PROVIDER NOTE - CPE EDP NEURO NORM
Medicated w/ tylenol for 4/10 R rib pain w/ relief  PT communicated increased CHIN, PA aware and accessed  Kpad ordered and applied  Pt uses IS w/ encouragement  Pt ambulated w/ CG RW, uses call bell appropriately  Al;lyvn intact on sacrum  Pt currently resting in bed no s/s of distress  Call bell in reach  Will continue to monitor and follow plan of care  normal...
